# Patient Record
Sex: FEMALE | Race: WHITE | Employment: FULL TIME | URBAN - METROPOLITAN AREA
[De-identification: names, ages, dates, MRNs, and addresses within clinical notes are randomized per-mention and may not be internally consistent; named-entity substitution may affect disease eponyms.]

---

## 2022-12-03 ENCOUNTER — HOSPITAL ENCOUNTER (INPATIENT)
Age: 67
LOS: 4 days | Discharge: HOME HEALTH | DRG: 480 | End: 2022-12-07
Attending: EMERGENCY MEDICINE | Admitting: FAMILY MEDICINE
Payer: MEDICARE

## 2022-12-03 ENCOUNTER — APPOINTMENT (OUTPATIENT)
Dept: CT IMAGING | Age: 67
DRG: 480 | End: 2022-12-03
Payer: MEDICARE

## 2022-12-03 ENCOUNTER — APPOINTMENT (OUTPATIENT)
Dept: GENERAL RADIOLOGY | Age: 67
DRG: 480 | End: 2022-12-03
Payer: MEDICARE

## 2022-12-03 DIAGNOSIS — M54.50 LUMBAR BACK PAIN: ICD-10-CM

## 2022-12-03 DIAGNOSIS — Z96.649 PERI-PROSTHETIC FRACTURE AROUND PROSTHETIC HIP, INITIAL ENCOUNTER: ICD-10-CM

## 2022-12-03 DIAGNOSIS — S01.01XA LACERATION OF SCALP, INITIAL ENCOUNTER: ICD-10-CM

## 2022-12-03 DIAGNOSIS — S09.90XA INJURY OF HEAD, INITIAL ENCOUNTER: Primary | ICD-10-CM

## 2022-12-03 DIAGNOSIS — S72.009A CLOSED FRACTURE OF PROXIMAL END OF FEMUR (HCC): ICD-10-CM

## 2022-12-03 DIAGNOSIS — M97.8XXA PERI-PROSTHETIC FRACTURE AROUND PROSTHETIC HIP, INITIAL ENCOUNTER: ICD-10-CM

## 2022-12-03 LAB
ABO/RH: NORMAL
ALBUMIN SERPL-MCNC: 3.9 G/DL (ref 3.5–5.2)
ALP BLD-CCNC: 102 U/L (ref 35–104)
ALT SERPL-CCNC: 14 U/L (ref 0–32)
ANION GAP SERPL CALCULATED.3IONS-SCNC: 15 MMOL/L (ref 7–16)
ANTIBODY SCREEN: NORMAL
APTT: 27 SEC (ref 24.5–35.1)
AST SERPL-CCNC: 21 U/L (ref 0–31)
BASOPHILS ABSOLUTE: 0.06 E9/L (ref 0–0.2)
BASOPHILS RELATIVE PERCENT: 0.4 % (ref 0–2)
BILIRUB SERPL-MCNC: 0.2 MG/DL (ref 0–1.2)
BUN BLDV-MCNC: 21 MG/DL (ref 6–23)
CALCIUM SERPL-MCNC: 9 MG/DL (ref 8.6–10.2)
CHLORIDE BLD-SCNC: 105 MMOL/L (ref 98–107)
CO2: 20 MMOL/L (ref 22–29)
CREAT SERPL-MCNC: 0.8 MG/DL (ref 0.5–1)
EOSINOPHILS ABSOLUTE: 0.05 E9/L (ref 0.05–0.5)
EOSINOPHILS RELATIVE PERCENT: 0.3 % (ref 0–6)
GFR SERPL CREATININE-BSD FRML MDRD: >60 ML/MIN/1.73
GLUCOSE BLD-MCNC: 171 MG/DL (ref 74–99)
HCT VFR BLD CALC: 38 % (ref 34–48)
HEMOGLOBIN: 12.7 G/DL (ref 11.5–15.5)
IMMATURE GRANULOCYTES #: 0.09 E9/L
IMMATURE GRANULOCYTES %: 0.6 % (ref 0–5)
INR BLD: 1
LYMPHOCYTES ABSOLUTE: 1.05 E9/L (ref 1.5–4)
LYMPHOCYTES RELATIVE PERCENT: 7.3 % (ref 20–42)
MCH RBC QN AUTO: 32.6 PG (ref 26–35)
MCHC RBC AUTO-ENTMCNC: 33.4 % (ref 32–34.5)
MCV RBC AUTO: 97.7 FL (ref 80–99.9)
MONOCYTES ABSOLUTE: 0.73 E9/L (ref 0.1–0.95)
MONOCYTES RELATIVE PERCENT: 5.1 % (ref 2–12)
NEUTROPHILS ABSOLUTE: 12.37 E9/L (ref 1.8–7.3)
NEUTROPHILS RELATIVE PERCENT: 86.3 % (ref 43–80)
PDW BLD-RTO: 14.9 FL (ref 11.5–15)
PLATELET # BLD: 245 E9/L (ref 130–450)
PMV BLD AUTO: 11 FL (ref 7–12)
POTASSIUM SERPL-SCNC: 3.8 MMOL/L (ref 3.5–5)
PROTHROMBIN TIME: 11.2 SEC (ref 9.3–12.4)
RBC # BLD: 3.89 E12/L (ref 3.5–5.5)
SODIUM BLD-SCNC: 140 MMOL/L (ref 132–146)
TOTAL PROTEIN: 7.1 G/DL (ref 6.4–8.3)
TROPONIN, HIGH SENSITIVITY: 9 NG/L (ref 0–9)
WBC # BLD: 14.4 E9/L (ref 4.5–11.5)

## 2022-12-03 PROCEDURE — 85730 THROMBOPLASTIN TIME PARTIAL: CPT

## 2022-12-03 PROCEDURE — 86850 RBC ANTIBODY SCREEN: CPT

## 2022-12-03 PROCEDURE — 85610 PROTHROMBIN TIME: CPT

## 2022-12-03 PROCEDURE — 86900 BLOOD TYPING SEROLOGIC ABO: CPT

## 2022-12-03 PROCEDURE — 96376 TX/PRO/DX INJ SAME DRUG ADON: CPT

## 2022-12-03 PROCEDURE — 73552 X-RAY EXAM OF FEMUR 2/>: CPT

## 2022-12-03 PROCEDURE — 99222 1ST HOSP IP/OBS MODERATE 55: CPT | Performed by: ORTHOPAEDIC SURGERY

## 2022-12-03 PROCEDURE — 70450 CT HEAD/BRAIN W/O DYE: CPT

## 2022-12-03 PROCEDURE — 71045 X-RAY EXAM CHEST 1 VIEW: CPT

## 2022-12-03 PROCEDURE — 72125 CT NECK SPINE W/O DYE: CPT

## 2022-12-03 PROCEDURE — 12001 RPR S/N/AX/GEN/TRNK 2.5CM/<: CPT

## 2022-12-03 PROCEDURE — 86901 BLOOD TYPING SEROLOGIC RH(D): CPT

## 2022-12-03 PROCEDURE — 99285 EMERGENCY DEPT VISIT HI MDM: CPT

## 2022-12-03 PROCEDURE — 73700 CT LOWER EXTREMITY W/O DYE: CPT

## 2022-12-03 PROCEDURE — 96374 THER/PROPH/DIAG INJ IV PUSH: CPT

## 2022-12-03 PROCEDURE — 90714 TD VACC NO PRESV 7 YRS+ IM: CPT

## 2022-12-03 PROCEDURE — 80053 COMPREHEN METABOLIC PANEL: CPT

## 2022-12-03 PROCEDURE — 84484 ASSAY OF TROPONIN QUANT: CPT

## 2022-12-03 PROCEDURE — 72131 CT LUMBAR SPINE W/O DYE: CPT

## 2022-12-03 PROCEDURE — 6360000002 HC RX W HCPCS: Performed by: EMERGENCY MEDICINE

## 2022-12-03 PROCEDURE — 96375 TX/PRO/DX INJ NEW DRUG ADDON: CPT

## 2022-12-03 PROCEDURE — 90471 IMMUNIZATION ADMIN: CPT

## 2022-12-03 PROCEDURE — 6360000002 HC RX W HCPCS

## 2022-12-03 PROCEDURE — 1200000000 HC SEMI PRIVATE

## 2022-12-03 PROCEDURE — 85025 COMPLETE CBC W/AUTO DIFF WBC: CPT

## 2022-12-03 PROCEDURE — 36415 COLL VENOUS BLD VENIPUNCTURE: CPT

## 2022-12-03 PROCEDURE — 73502 X-RAY EXAM HIP UNI 2-3 VIEWS: CPT

## 2022-12-03 PROCEDURE — 93005 ELECTROCARDIOGRAM TRACING: CPT | Performed by: EMERGENCY MEDICINE

## 2022-12-03 RX ORDER — OXYCODONE HYDROCHLORIDE 10 MG/1
10 TABLET ORAL EVERY 4 HOURS PRN
Status: DISCONTINUED | OUTPATIENT
Start: 2022-12-03 | End: 2022-12-07 | Stop reason: HOSPADM

## 2022-12-03 RX ORDER — TETANUS AND DIPHTHERIA TOXOIDS ADSORBED 2; 2 [LF]/.5ML; [LF]/.5ML
INJECTION INTRAMUSCULAR
Status: COMPLETED
Start: 2022-12-03 | End: 2022-12-03

## 2022-12-03 RX ORDER — ONDANSETRON 2 MG/ML
INJECTION INTRAMUSCULAR; INTRAVENOUS
Status: COMPLETED
Start: 2022-12-03 | End: 2022-12-03

## 2022-12-03 RX ORDER — OXYCODONE HYDROCHLORIDE 5 MG/1
5 TABLET ORAL EVERY 4 HOURS PRN
Status: DISCONTINUED | OUTPATIENT
Start: 2022-12-03 | End: 2022-12-07 | Stop reason: HOSPADM

## 2022-12-03 RX ORDER — ACETAMINOPHEN 325 MG/1
650 TABLET ORAL EVERY 4 HOURS PRN
Status: DISCONTINUED | OUTPATIENT
Start: 2022-12-03 | End: 2022-12-04

## 2022-12-03 RX ORDER — ONDANSETRON 2 MG/ML
4 INJECTION INTRAMUSCULAR; INTRAVENOUS ONCE
Status: COMPLETED | OUTPATIENT
Start: 2022-12-03 | End: 2022-12-03

## 2022-12-03 RX ORDER — TETANUS AND DIPHTHERIA TOXOIDS ADSORBED 2; 2 [LF]/.5ML; [LF]/.5ML
0.5 INJECTION INTRAMUSCULAR ONCE
Status: COMPLETED | OUTPATIENT
Start: 2022-12-03 | End: 2022-12-03

## 2022-12-03 RX ORDER — GABAPENTIN 100 MG/1
200 CAPSULE ORAL 3 TIMES DAILY
Status: DISCONTINUED | OUTPATIENT
Start: 2022-12-03 | End: 2022-12-07 | Stop reason: HOSPADM

## 2022-12-03 RX ORDER — BACITRACIN ZINC 500 [USP'U]/G
OINTMENT TOPICAL ONCE
Status: DISCONTINUED | OUTPATIENT
Start: 2022-12-03 | End: 2022-12-04

## 2022-12-03 RX ORDER — MORPHINE SULFATE 4 MG/ML
4 INJECTION, SOLUTION INTRAMUSCULAR; INTRAVENOUS ONCE
Status: COMPLETED | OUTPATIENT
Start: 2022-12-03 | End: 2022-12-03

## 2022-12-03 RX ORDER — METHOCARBAMOL 500 MG/1
1000 TABLET, FILM COATED ORAL 4 TIMES DAILY
Status: DISCONTINUED | OUTPATIENT
Start: 2022-12-03 | End: 2022-12-07 | Stop reason: HOSPADM

## 2022-12-03 RX ORDER — MORPHINE SULFATE 4 MG/ML
INJECTION, SOLUTION INTRAMUSCULAR; INTRAVENOUS
Status: COMPLETED
Start: 2022-12-03 | End: 2022-12-03

## 2022-12-03 RX ADMIN — MORPHINE SULFATE 4 MG: 4 INJECTION, SOLUTION INTRAMUSCULAR; INTRAVENOUS at 21:53

## 2022-12-03 RX ADMIN — MORPHINE SULFATE 4 MG: 4 INJECTION, SOLUTION INTRAMUSCULAR; INTRAVENOUS at 20:16

## 2022-12-03 RX ADMIN — TETANUS AND DIPHTHERIA TOXOIDS ADSORBED 0.5 ML: 2; 2 INJECTION INTRAMUSCULAR at 22:55

## 2022-12-03 RX ADMIN — ONDANSETRON 4 MG: 2 INJECTION INTRAMUSCULAR; INTRAVENOUS at 19:35

## 2022-12-03 ASSESSMENT — PAIN - FUNCTIONAL ASSESSMENT: PAIN_FUNCTIONAL_ASSESSMENT: 0-10

## 2022-12-03 ASSESSMENT — PAIN DESCRIPTION - LOCATION
LOCATION: HIP;LEG
LOCATION: LEG
LOCATION: HIP;LEG

## 2022-12-03 ASSESSMENT — PAIN DESCRIPTION - ORIENTATION
ORIENTATION: RIGHT

## 2022-12-03 ASSESSMENT — PAIN DESCRIPTION - PAIN TYPE
TYPE: ACUTE PAIN
TYPE: ACUTE PAIN

## 2022-12-03 ASSESSMENT — PAIN DESCRIPTION - DESCRIPTORS
DESCRIPTORS: ACHING
DESCRIPTORS: ACHING

## 2022-12-03 ASSESSMENT — PAIN DESCRIPTION - FREQUENCY
FREQUENCY: CONTINUOUS
FREQUENCY: CONTINUOUS

## 2022-12-03 ASSESSMENT — PAIN SCALES - GENERAL
PAINLEVEL_OUTOF10: 9
PAINLEVEL_OUTOF10: 10
PAINLEVEL_OUTOF10: 8

## 2022-12-04 ENCOUNTER — ANESTHESIA EVENT (OUTPATIENT)
Dept: OPERATING ROOM | Age: 67
End: 2022-12-04
Payer: MEDICARE

## 2022-12-04 ENCOUNTER — ANESTHESIA (OUTPATIENT)
Dept: OPERATING ROOM | Age: 67
End: 2022-12-04
Payer: MEDICARE

## 2022-12-04 ENCOUNTER — APPOINTMENT (OUTPATIENT)
Dept: GENERAL RADIOLOGY | Age: 67
DRG: 480 | End: 2022-12-04
Payer: MEDICARE

## 2022-12-04 LAB
ANION GAP SERPL CALCULATED.3IONS-SCNC: 12 MMOL/L (ref 7–16)
BASOPHILS ABSOLUTE: 0.05 E9/L (ref 0–0.2)
BASOPHILS RELATIVE PERCENT: 0.4 % (ref 0–2)
BUN BLDV-MCNC: 17 MG/DL (ref 6–23)
CALCIUM SERPL-MCNC: 8.7 MG/DL (ref 8.6–10.2)
CHLORIDE BLD-SCNC: 106 MMOL/L (ref 98–107)
CO2: 21 MMOL/L (ref 22–29)
CREAT SERPL-MCNC: 0.7 MG/DL (ref 0.5–1)
EOSINOPHILS ABSOLUTE: 0.04 E9/L (ref 0.05–0.5)
EOSINOPHILS RELATIVE PERCENT: 0.4 % (ref 0–6)
GFR SERPL CREATININE-BSD FRML MDRD: >60 ML/MIN/1.73
GLUCOSE BLD-MCNC: 120 MG/DL (ref 74–99)
HCT VFR BLD CALC: 36.1 % (ref 34–48)
HEMOGLOBIN: 11.8 G/DL (ref 11.5–15.5)
IMMATURE GRANULOCYTES #: 0.05 E9/L
IMMATURE GRANULOCYTES %: 0.4 % (ref 0–5)
LYMPHOCYTES ABSOLUTE: 1.42 E9/L (ref 1.5–4)
LYMPHOCYTES RELATIVE PERCENT: 12.7 % (ref 20–42)
MCH RBC QN AUTO: 32.8 PG (ref 26–35)
MCHC RBC AUTO-ENTMCNC: 32.7 % (ref 32–34.5)
MCV RBC AUTO: 100.3 FL (ref 80–99.9)
MONOCYTES ABSOLUTE: 0.81 E9/L (ref 0.1–0.95)
MONOCYTES RELATIVE PERCENT: 7.2 % (ref 2–12)
NEUTROPHILS ABSOLUTE: 8.83 E9/L (ref 1.8–7.3)
NEUTROPHILS RELATIVE PERCENT: 78.9 % (ref 43–80)
PDW BLD-RTO: 15.1 FL (ref 11.5–15)
PLATELET # BLD: 246 E9/L (ref 130–450)
PMV BLD AUTO: 10.5 FL (ref 7–12)
POTASSIUM REFLEX MAGNESIUM: 4.1 MMOL/L (ref 3.5–5)
RBC # BLD: 3.6 E12/L (ref 3.5–5.5)
SODIUM BLD-SCNC: 139 MMOL/L (ref 132–146)
WBC # BLD: 11.2 E9/L (ref 4.5–11.5)

## 2022-12-04 PROCEDURE — 27244 TREAT THIGH FRACTURE: CPT | Performed by: ORTHOPAEDIC SURGERY

## 2022-12-04 PROCEDURE — 6370000000 HC RX 637 (ALT 250 FOR IP)

## 2022-12-04 PROCEDURE — 2720000010 HC SURG SUPPLY STERILE: Performed by: ORTHOPAEDIC SURGERY

## 2022-12-04 PROCEDURE — 2700000000 HC OXYGEN THERAPY PER DAY

## 2022-12-04 PROCEDURE — 3700000001 HC ADD 15 MINUTES (ANESTHESIA): Performed by: ORTHOPAEDIC SURGERY

## 2022-12-04 PROCEDURE — 6360000002 HC RX W HCPCS: Performed by: PHYSICAL THERAPY ASSISTANT

## 2022-12-04 PROCEDURE — 2580000003 HC RX 258: Performed by: PHYSICAL THERAPY ASSISTANT

## 2022-12-04 PROCEDURE — 6370000000 HC RX 637 (ALT 250 FOR IP): Performed by: FAMILY MEDICINE

## 2022-12-04 PROCEDURE — 6370000000 HC RX 637 (ALT 250 FOR IP): Performed by: PHYSICAL THERAPY ASSISTANT

## 2022-12-04 PROCEDURE — C1776 JOINT DEVICE (IMPLANTABLE): HCPCS | Performed by: ORTHOPAEDIC SURGERY

## 2022-12-04 PROCEDURE — 80048 BASIC METABOLIC PNL TOTAL CA: CPT

## 2022-12-04 PROCEDURE — 1200000000 HC SEMI PRIVATE

## 2022-12-04 PROCEDURE — 85025 COMPLETE CBC W/AUTO DIFF WBC: CPT

## 2022-12-04 PROCEDURE — 2580000003 HC RX 258: Performed by: FAMILY MEDICINE

## 2022-12-04 PROCEDURE — 36415 COLL VENOUS BLD VENIPUNCTURE: CPT

## 2022-12-04 PROCEDURE — 6360000002 HC RX W HCPCS: Performed by: FAMILY MEDICINE

## 2022-12-04 PROCEDURE — 7100000000 HC PACU RECOVERY - FIRST 15 MIN: Performed by: ORTHOPAEDIC SURGERY

## 2022-12-04 PROCEDURE — 0QS604Z REPOSITION RIGHT UPPER FEMUR WITH INTERNAL FIXATION DEVICE, OPEN APPROACH: ICD-10-PCS | Performed by: ORTHOPAEDIC SURGERY

## 2022-12-04 PROCEDURE — 73552 X-RAY EXAM OF FEMUR 2/>: CPT

## 2022-12-04 PROCEDURE — C1769 GUIDE WIRE: HCPCS | Performed by: ORTHOPAEDIC SURGERY

## 2022-12-04 PROCEDURE — 3600000015 HC SURGERY LEVEL 5 ADDTL 15MIN: Performed by: ORTHOPAEDIC SURGERY

## 2022-12-04 PROCEDURE — 73502 X-RAY EXAM HIP UNI 2-3 VIEWS: CPT

## 2022-12-04 PROCEDURE — 2500000003 HC RX 250 WO HCPCS: Performed by: NURSE ANESTHETIST, CERTIFIED REGISTERED

## 2022-12-04 PROCEDURE — 3700000000 HC ANESTHESIA ATTENDED CARE: Performed by: ORTHOPAEDIC SURGERY

## 2022-12-04 PROCEDURE — 6360000002 HC RX W HCPCS: Performed by: NURSE ANESTHETIST, CERTIFIED REGISTERED

## 2022-12-04 PROCEDURE — C1713 ANCHOR/SCREW BN/BN,TIS/BN: HCPCS | Performed by: ORTHOPAEDIC SURGERY

## 2022-12-04 PROCEDURE — 3600000005 HC SURGERY LEVEL 5 BASE: Performed by: ORTHOPAEDIC SURGERY

## 2022-12-04 PROCEDURE — 2709999900 HC NON-CHARGEABLE SUPPLY: Performed by: ORTHOPAEDIC SURGERY

## 2022-12-04 PROCEDURE — 6360000002 HC RX W HCPCS: Performed by: ANESTHESIOLOGY

## 2022-12-04 PROCEDURE — 3209999900 FLUORO FOR SURGICAL PROCEDURES

## 2022-12-04 PROCEDURE — 2580000003 HC RX 258: Performed by: NURSE ANESTHETIST, CERTIFIED REGISTERED

## 2022-12-04 PROCEDURE — 7100000001 HC PACU RECOVERY - ADDTL 15 MIN: Performed by: ORTHOPAEDIC SURGERY

## 2022-12-04 PROCEDURE — 6360000002 HC RX W HCPCS: Performed by: ORTHOPAEDIC SURGERY

## 2022-12-04 DEVICE — SCREW BNE L34MM DIA4.5MM PROX CORT TIB S STL ST LOK FULL: Type: IMPLANTABLE DEVICE | Site: FEMUR | Status: FUNCTIONAL

## 2022-12-04 DEVICE — SCREW BNE L38MM DIA4.5MM PROX CORT TIB S STL ST LOK FULL: Type: IMPLANTABLE DEVICE | Site: FEMUR | Status: FUNCTIONAL

## 2022-12-04 DEVICE — SCREW BNE L10MM DIA5MM UNICORTICAL PERIPROSTHETIC S STL ST: Type: IMPLANTABLE DEVICE | Site: FEMUR | Status: FUNCTIONAL

## 2022-12-04 DEVICE — IMPLANTABLE DEVICE: Type: IMPLANTABLE DEVICE | Site: FEMUR | Status: FUNCTIONAL

## 2022-12-04 DEVICE — CABLE SURG L750MM DIA1MM S STL SMOOTH W/ CRMP: Type: IMPLANTABLE DEVICE | Site: FEMUR | Status: FUNCTIONAL

## 2022-12-04 DEVICE — SCREW BNE L75MM DIA3.5MM PROX TIB S STL ST FULL THRD T15: Type: IMPLANTABLE DEVICE | Site: FEMUR | Status: FUNCTIONAL

## 2022-12-04 DEVICE — SCREW BNE L30MM DIA3.5MM CORT S STL ST FULL THRD LOK T15: Type: IMPLANTABLE DEVICE | Site: FEMUR | Status: FUNCTIONAL

## 2022-12-04 DEVICE — SCREW BNE L32MM DIA4.5MM PROX CORT TIB S STL ST LOK FULL: Type: IMPLANTABLE DEVICE | Site: FEMUR | Status: FUNCTIONAL

## 2022-12-04 DEVICE — SCREW BNE L28MM DIA3.5MM CORT S STL ST FULL THRD LOK T15: Type: IMPLANTABLE DEVICE | Site: FEMUR | Status: FUNCTIONAL

## 2022-12-04 DEVICE — SCREW BNE L26MM DIA3.5MM PROX TIB S STL ST FULL THRD T15: Type: IMPLANTABLE DEVICE | Site: FEMUR | Status: FUNCTIONAL

## 2022-12-04 RX ORDER — FENTANYL CITRATE 50 UG/ML
INJECTION, SOLUTION INTRAMUSCULAR; INTRAVENOUS PRN
Status: DISCONTINUED | OUTPATIENT
Start: 2022-12-04 | End: 2022-12-04 | Stop reason: SDUPTHER

## 2022-12-04 RX ORDER — SODIUM CHLORIDE 0.9 % (FLUSH) 0.9 %
5-40 SYRINGE (ML) INJECTION EVERY 12 HOURS SCHEDULED
Status: DISCONTINUED | OUTPATIENT
Start: 2022-12-04 | End: 2022-12-05 | Stop reason: ALTCHOICE

## 2022-12-04 RX ORDER — NEOSTIGMINE METHYLSULFATE 1 MG/ML
INJECTION, SOLUTION INTRAVENOUS PRN
Status: DISCONTINUED | OUTPATIENT
Start: 2022-12-04 | End: 2022-12-04 | Stop reason: SDUPTHER

## 2022-12-04 RX ORDER — ACETAMINOPHEN 325 MG/1
650 TABLET ORAL EVERY 6 HOURS PRN
Status: DISCONTINUED | OUTPATIENT
Start: 2022-12-04 | End: 2022-12-07 | Stop reason: HOSPADM

## 2022-12-04 RX ORDER — ONDANSETRON 2 MG/ML
4 INJECTION INTRAMUSCULAR; INTRAVENOUS EVERY 6 HOURS PRN
Status: DISCONTINUED | OUTPATIENT
Start: 2022-12-04 | End: 2022-12-07 | Stop reason: HOSPADM

## 2022-12-04 RX ORDER — METOPROLOL TARTRATE 5 MG/5ML
INJECTION INTRAVENOUS PRN
Status: DISCONTINUED | OUTPATIENT
Start: 2022-12-04 | End: 2022-12-04 | Stop reason: SDUPTHER

## 2022-12-04 RX ORDER — PROMETHAZINE HYDROCHLORIDE 12.5 MG/1
12.5 TABLET ORAL EVERY 6 HOURS PRN
Status: DISCONTINUED | OUTPATIENT
Start: 2022-12-04 | End: 2022-12-07 | Stop reason: HOSPADM

## 2022-12-04 RX ORDER — KETOROLAC TROMETHAMINE 30 MG/ML
INJECTION, SOLUTION INTRAMUSCULAR; INTRAVENOUS PRN
Status: DISCONTINUED | OUTPATIENT
Start: 2022-12-04 | End: 2022-12-04 | Stop reason: SDUPTHER

## 2022-12-04 RX ORDER — MIDAZOLAM HYDROCHLORIDE 1 MG/ML
INJECTION INTRAMUSCULAR; INTRAVENOUS PRN
Status: DISCONTINUED | OUTPATIENT
Start: 2022-12-04 | End: 2022-12-04 | Stop reason: SDUPTHER

## 2022-12-04 RX ORDER — SODIUM CHLORIDE 0.9 % (FLUSH) 0.9 %
5-40 SYRINGE (ML) INJECTION PRN
Status: DISCONTINUED | OUTPATIENT
Start: 2022-12-04 | End: 2022-12-05 | Stop reason: ALTCHOICE

## 2022-12-04 RX ORDER — ROCURONIUM BROMIDE 10 MG/ML
INJECTION, SOLUTION INTRAVENOUS PRN
Status: DISCONTINUED | OUTPATIENT
Start: 2022-12-04 | End: 2022-12-04 | Stop reason: SDUPTHER

## 2022-12-04 RX ORDER — OXYCODONE HYDROCHLORIDE AND ACETAMINOPHEN 5; 325 MG/1; MG/1
1 TABLET ORAL EVERY 6 HOURS PRN
Qty: 28 TABLET | Refills: 0 | Status: SHIPPED | OUTPATIENT
Start: 2022-12-04 | End: 2022-12-11

## 2022-12-04 RX ORDER — SODIUM CHLORIDE 9 MG/ML
INJECTION, SOLUTION INTRAVENOUS PRN
Status: DISCONTINUED | OUTPATIENT
Start: 2022-12-04 | End: 2022-12-05 | Stop reason: ALTCHOICE

## 2022-12-04 RX ORDER — SODIUM CHLORIDE 9 MG/ML
INJECTION, SOLUTION INTRAVENOUS PRN
Status: DISCONTINUED | OUTPATIENT
Start: 2022-12-04 | End: 2022-12-07 | Stop reason: HOSPADM

## 2022-12-04 RX ORDER — GLYCOPYRROLATE 0.2 MG/ML
INJECTION INTRAMUSCULAR; INTRAVENOUS PRN
Status: DISCONTINUED | OUTPATIENT
Start: 2022-12-04 | End: 2022-12-04 | Stop reason: SDUPTHER

## 2022-12-04 RX ORDER — VANCOMYCIN HYDROCHLORIDE 500 MG/10ML
INJECTION, POWDER, LYOPHILIZED, FOR SOLUTION INTRAVENOUS PRN
Status: DISCONTINUED | OUTPATIENT
Start: 2022-12-04 | End: 2022-12-04 | Stop reason: ALTCHOICE

## 2022-12-04 RX ORDER — CEFAZOLIN SODIUM 1 G/3ML
INJECTION, POWDER, FOR SOLUTION INTRAMUSCULAR; INTRAVENOUS PRN
Status: DISCONTINUED | OUTPATIENT
Start: 2022-12-04 | End: 2022-12-04 | Stop reason: SDUPTHER

## 2022-12-04 RX ORDER — SODIUM CHLORIDE 9 MG/ML
INJECTION, SOLUTION INTRAVENOUS CONTINUOUS
Status: DISCONTINUED | OUTPATIENT
Start: 2022-12-04 | End: 2022-12-07 | Stop reason: HOSPADM

## 2022-12-04 RX ORDER — ONDANSETRON 2 MG/ML
4 INJECTION INTRAMUSCULAR; INTRAVENOUS
Status: ACTIVE | OUTPATIENT
Start: 2022-12-04 | End: 2022-12-05

## 2022-12-04 RX ORDER — PROPOFOL 10 MG/ML
INJECTION, EMULSION INTRAVENOUS PRN
Status: DISCONTINUED | OUTPATIENT
Start: 2022-12-04 | End: 2022-12-04 | Stop reason: SDUPTHER

## 2022-12-04 RX ORDER — DEXAMETHASONE SODIUM PHOSPHATE 10 MG/ML
INJECTION INTRAMUSCULAR; INTRAVENOUS PRN
Status: DISCONTINUED | OUTPATIENT
Start: 2022-12-04 | End: 2022-12-04 | Stop reason: SDUPTHER

## 2022-12-04 RX ORDER — SODIUM CHLORIDE 9 MG/ML
INJECTION, SOLUTION INTRAVENOUS CONTINUOUS PRN
Status: DISCONTINUED | OUTPATIENT
Start: 2022-12-04 | End: 2022-12-04 | Stop reason: SDUPTHER

## 2022-12-04 RX ORDER — POLYETHYLENE GLYCOL 3350 17 G/17G
17 POWDER, FOR SOLUTION ORAL DAILY PRN
Status: DISCONTINUED | OUTPATIENT
Start: 2022-12-04 | End: 2022-12-05

## 2022-12-04 RX ORDER — SODIUM CHLORIDE 0.9 % (FLUSH) 0.9 %
10 SYRINGE (ML) INJECTION PRN
Status: DISCONTINUED | OUTPATIENT
Start: 2022-12-04 | End: 2022-12-07 | Stop reason: HOSPADM

## 2022-12-04 RX ORDER — ONDANSETRON 2 MG/ML
INJECTION INTRAMUSCULAR; INTRAVENOUS PRN
Status: DISCONTINUED | OUTPATIENT
Start: 2022-12-04 | End: 2022-12-04 | Stop reason: SDUPTHER

## 2022-12-04 RX ORDER — SODIUM CHLORIDE 0.9 % (FLUSH) 0.9 %
10 SYRINGE (ML) INJECTION EVERY 12 HOURS SCHEDULED
Status: DISCONTINUED | OUTPATIENT
Start: 2022-12-04 | End: 2022-12-07 | Stop reason: HOSPADM

## 2022-12-04 RX ORDER — ACETAMINOPHEN 650 MG/1
650 SUPPOSITORY RECTAL EVERY 6 HOURS PRN
Status: DISCONTINUED | OUTPATIENT
Start: 2022-12-04 | End: 2022-12-07 | Stop reason: HOSPADM

## 2022-12-04 RX ORDER — LIDOCAINE HYDROCHLORIDE 20 MG/ML
INJECTION, SOLUTION INTRAVENOUS PRN
Status: DISCONTINUED | OUTPATIENT
Start: 2022-12-04 | End: 2022-12-04 | Stop reason: SDUPTHER

## 2022-12-04 RX ORDER — MORPHINE SULFATE 4 MG/ML
4 INJECTION, SOLUTION INTRAMUSCULAR; INTRAVENOUS EVERY 4 HOURS PRN
Status: DISCONTINUED | OUTPATIENT
Start: 2022-12-04 | End: 2022-12-07 | Stop reason: HOSPADM

## 2022-12-04 RX ADMIN — DEXAMETHASONE SODIUM PHOSPHATE 10 MG: 10 INJECTION INTRAMUSCULAR; INTRAVENOUS at 10:40

## 2022-12-04 RX ADMIN — SODIUM CHLORIDE: 9 INJECTION, SOLUTION INTRAVENOUS at 15:30

## 2022-12-04 RX ADMIN — SODIUM CHLORIDE, PRESERVATIVE FREE 10 ML: 5 INJECTION INTRAVENOUS at 04:10

## 2022-12-04 RX ADMIN — GABAPENTIN 200 MG: 100 CAPSULE ORAL at 00:38

## 2022-12-04 RX ADMIN — METHOCARBAMOL 1000 MG: 500 TABLET ORAL at 20:18

## 2022-12-04 RX ADMIN — GLYCOPYRROLATE 0.6 MG: 0.2 INJECTION, SOLUTION INTRAMUSCULAR; INTRAVENOUS at 12:00

## 2022-12-04 RX ADMIN — GABAPENTIN 200 MG: 100 CAPSULE ORAL at 20:18

## 2022-12-04 RX ADMIN — PROPOFOL 90 MG: 10 INJECTION, EMULSION INTRAVENOUS at 10:40

## 2022-12-04 RX ADMIN — OXYCODONE HYDROCHLORIDE 10 MG: 10 TABLET ORAL at 20:18

## 2022-12-04 RX ADMIN — CEFAZOLIN 2 G: 1 INJECTION, POWDER, FOR SOLUTION INTRAMUSCULAR; INTRAVENOUS at 10:45

## 2022-12-04 RX ADMIN — CEFAZOLIN 2000 MG: 2 INJECTION, POWDER, FOR SOLUTION INTRAMUSCULAR; INTRAVENOUS at 20:19

## 2022-12-04 RX ADMIN — OXYCODONE HYDROCHLORIDE 10 MG: 10 TABLET ORAL at 07:01

## 2022-12-04 RX ADMIN — SODIUM CHLORIDE: 9 INJECTION, SOLUTION INTRAVENOUS at 06:55

## 2022-12-04 RX ADMIN — FENTANYL CITRATE 100 MCG: 50 INJECTION, SOLUTION INTRAMUSCULAR; INTRAVENOUS at 10:57

## 2022-12-04 RX ADMIN — Medication 3 MG: at 12:00

## 2022-12-04 RX ADMIN — FENTANYL CITRATE 100 MCG: 50 INJECTION, SOLUTION INTRAMUSCULAR; INTRAVENOUS at 10:40

## 2022-12-04 RX ADMIN — ROCURONIUM BROMIDE 50 MG: 10 INJECTION, SOLUTION INTRAVENOUS at 10:40

## 2022-12-04 RX ADMIN — HYDROMORPHONE HYDROCHLORIDE 0.25 MG: 1 INJECTION, SOLUTION INTRAMUSCULAR; INTRAVENOUS; SUBCUTANEOUS at 13:52

## 2022-12-04 RX ADMIN — METOPROLOL TARTRATE 2.5 MG: 5 INJECTION, SOLUTION INTRAVENOUS at 11:06

## 2022-12-04 RX ADMIN — LIDOCAINE HYDROCHLORIDE 60 MG: 20 INJECTION, SOLUTION INTRAVENOUS at 10:40

## 2022-12-04 RX ADMIN — SODIUM CHLORIDE, PRESERVATIVE FREE 10 ML: 5 INJECTION INTRAVENOUS at 20:19

## 2022-12-04 RX ADMIN — MIDAZOLAM 2 MG: 1 INJECTION INTRAMUSCULAR; INTRAVENOUS at 10:30

## 2022-12-04 RX ADMIN — OXYCODONE HYDROCHLORIDE 10 MG: 10 TABLET ORAL at 00:39

## 2022-12-04 RX ADMIN — METOPROLOL TARTRATE 2.5 MG: 5 INJECTION, SOLUTION INTRAVENOUS at 10:52

## 2022-12-04 RX ADMIN — FENTANYL CITRATE 50 MCG: 50 INJECTION, SOLUTION INTRAMUSCULAR; INTRAVENOUS at 11:07

## 2022-12-04 RX ADMIN — ROCURONIUM BROMIDE 20 MG: 10 INJECTION, SOLUTION INTRAVENOUS at 11:11

## 2022-12-04 RX ADMIN — KETOROLAC TROMETHAMINE 30 MG: 30 INJECTION, SOLUTION INTRAMUSCULAR at 12:00

## 2022-12-04 RX ADMIN — SODIUM CHLORIDE: 9 INJECTION, SOLUTION INTRAVENOUS at 10:30

## 2022-12-04 RX ADMIN — ONDANSETRON 4 MG: 2 INJECTION INTRAMUSCULAR; INTRAVENOUS at 11:57

## 2022-12-04 RX ADMIN — METHOCARBAMOL 1000 MG: 500 TABLET ORAL at 00:38

## 2022-12-04 RX ADMIN — MORPHINE SULFATE 4 MG: 4 INJECTION, SOLUTION INTRAMUSCULAR; INTRAVENOUS at 04:09

## 2022-12-04 ASSESSMENT — PAIN DESCRIPTION - LOCATION
LOCATION: HIP
LOCATION: HIP
LOCATION: HIP;LEG
LOCATION: HIP

## 2022-12-04 ASSESSMENT — PAIN DESCRIPTION - DESCRIPTORS
DESCRIPTORS: ACHING;BURNING;SHARP;SHOOTING
DESCRIPTORS: ACHING;DISCOMFORT;SHARP
DESCRIPTORS: ACHING;DISCOMFORT;SORE

## 2022-12-04 ASSESSMENT — PAIN DESCRIPTION - ORIENTATION
ORIENTATION: RIGHT

## 2022-12-04 ASSESSMENT — PAIN - FUNCTIONAL ASSESSMENT
PAIN_FUNCTIONAL_ASSESSMENT: PREVENTS OR INTERFERES SOME ACTIVE ACTIVITIES AND ADLS
PAIN_FUNCTIONAL_ASSESSMENT: PREVENTS OR INTERFERES SOME ACTIVE ACTIVITIES AND ADLS

## 2022-12-04 ASSESSMENT — PAIN SCALES - GENERAL
PAINLEVEL_OUTOF10: 9
PAINLEVEL_OUTOF10: 5
PAINLEVEL_OUTOF10: 5
PAINLEVEL_OUTOF10: 10
PAINLEVEL_OUTOF10: 0
PAINLEVEL_OUTOF10: 0
PAINLEVEL_OUTOF10: 6
PAINLEVEL_OUTOF10: 8

## 2022-12-04 ASSESSMENT — LIFESTYLE VARIABLES: SMOKING_STATUS: 1

## 2022-12-04 NOTE — ANESTHESIA POSTPROCEDURE EVALUATION
Department of Anesthesiology  Postprocedure Note    Patient: Page Anderson  MRN: [id number]  [place]: 1955  Date of evaluation: 12/4/2022      Procedure Summary     Date: 12/04/22 [place] [facility] [place] [facility]    Anesthesia Start: 1030 Anesthesia Stop: 1247    Procedure: ORIF RIGHT PERIPROSTHETIC HIP FRACTURE (Right: Hip) Diagnosis:       Periprosthetic fracture of hip, initial encounter      (RIGHT PERIPROSTHETIC HIP FRACTURE)    Surgeons: Wei Gagnon DO Responsible Provider: Víctor Flynn MD    Anesthesia Type: General ASA Status: 3          Anesthesia Type: General    Juan Diego Phase I: Juan Diego Score: 5    Juan Diego Phase II:        Anesthesia Post Evaluation    Patient location during evaluation: PACU  Patient participation: complete - patient participated  Level of consciousness: awake  Pain score: 3  Airway patency: patent  Nausea & Vomiting: no nausea  Complications: no  Cardiovascular status: hemodynamically stable  Respiratory status: acceptable  Hydration status: stable  Multimodal analgesia pain management approach

## 2022-12-04 NOTE — DISCHARGE INSTRUCTIONS
[facility] Department of Orthopedic Surgery  [address]    Dr. Adair Fisher DO         MD Dr. Ariel Maravilla MD Jill Roger, PA-C Sonna Nian PA-C Lester Herder PA-C      Orthopaedics Discharge Instructions   Weight bearing Status - Toe touch weight bearing - on right lower Extremity  Pain medication Per Prescriptions  Contact Office for Medication Refill- [phone]  Office can refill pain med every 7 days  If patient discharging to facility then pain control will be continued per facility physician  Ice to operative/injured site for 15-30 minutes of each hour for next 5 days    Recommend that you continue to ice the area 2-3 times per day after this   Elevate operative/injured limb on 2 pillows at home  Goal is to have limb above the heart if able  Continue DVT Prophylaxis (blood clot prevention) as Prescribed   Wound care - Can take off the dressing at the surgical site seven days after the date of surgery. Can just peel off. After, do daily dressing changes as needed until the drainage from the surgical site ceases. Follow Up in Office in 2 weeks. Your first post op appointment is often with one of our PAs. Call the office at [phone] or directions or with any questions. Watch for these significant complications. Call physician if they or any other problems occur:  Fever over 101°, redness, swelling or warmth at the operative site  Unrelieved nausea    Foul smelling or cloudy drainage at the operative site   Unrelieved pain    Blood soaked dressing. (Some oozing may be normal)     Numb, pale, blue, cold or tingling extremity    No future appointments. It is the Department of Orthopaedic Trauma's standard of practice that providers will de-escalate(wean) all patients from narcotic(opioid) medications during the post-operative period.    We provide multimodal pain control but opioid medications are tapered in all of our patients. If patient requires referral to pain management for prolonged taper off of opioid pain medication we will facilitate this process.       Bring [facility] papers to appointment or fax them to [phone]

## 2022-12-04 NOTE — CONSULTS
Department of Orthopedic Surgery  Resident Consult Note          Reason for Consult: Right Hip Pain    HISTORY OF PRESENT ILLNESS:       Patient is a 79 y.o. female who presents with hip pain after a fall. Patient reports fall that occurred approximately 2 hours ago down steps. Reports head trauma without loss of consciousness. Pain localized to right thigh and nonradiating. Anticoagulation - none. The patient is currently employed as a Banker. The patient is community Ambulator without assistant. . Pt lives at home. Patient has a significant history of HTN. Bilateral total hip arthroplasty; most recent was the right hip performed by Dr. Jennifer Rene in [place] 7/11/2017. Denies numbness/tingling/paresthesias. Reports some nausea. Denies acute fever, chills, nchest pain and shortness of breath. Denies any other orthopedic complaints at this time. Tobacco use: 1 PPD   Alcohol use: none  Illicit drug use: no history of illicit drug use    Past Medical History:    History reviewed. No pertinent past medical history. Past Surgical History:    History reviewed. No pertinent surgical history. Current Medications:   Current Facility-Administered Medications: methocarbamol (ROBAXIN) tablet 1,000 mg, 1,000 mg, Oral, 4x Daily  gabapentin (NEURONTIN) capsule 200 mg, 200 mg, Oral, TID  oxyCODONE (ROXICODONE) immediate release tablet 5 mg, 5 mg, Oral, Q4H PRN **OR** oxyCODONE HCl (OXY-IR) immediate release tablet 10 mg, 10 mg, Oral, Q4H PRN  acetaminophen (TYLENOL) tablet 650 mg, 650 mg, Oral, Q4H PRN  Allergies:  Patient has no allergy information on record. Social History:   TOBACCO:   has no history on file for tobacco use. ETOH:   has no history on file for alcohol use. DRUGS:   has no history on file for drug use. ACTIVITIES OF DAILY LIVING:    OCCUPATION:    Family History:   History reviewed. No pertinent family history.     REVIEW OF SYSTEMS:  CONSTITUTIONAL:  negative for  fevers, chills  EYES:  negative for blurred vision, visual disturbance  HEENT:  negative for  hearing loss, voice change  RESPIRATORY:  negative for  dyspnea, wheezing  CARDIOVASCULAR:  negative for  chest pain, palpitations  GASTROINTESTINAL:  negative for nausea, vomiting  GENITOURINARY:  negative for frequency, urinary incontinence  HEMATOLOGIC/LYMPHATIC:  negative for bleeding and petechiae  MUSCULOSKELETAL:  positive for  pain, decreased range of motion, and bone pain  NEUROLOGICAL:  negative for headaches, dizziness  BEHAVIOR/PSYCH:  negative for increased agitation and anxiety    PHYSICAL EXAM:    VITALS:  BP (!) 167/76   Pulse (!) 105   Temp 98 °F (36.7 °C)   Resp 18   Wt 190 lb (86.2 kg)   SpO2 93%   CONSTITUTIONAL:  awake, alert, cooperative, no apparent distress, and appears stated age  General appearance: alert, well appearing, and in no distress,  normal appearing weight  Mental status: alert, oriented to person, place, and time, normal mood, behavior, speech, dress, motor activity, and thought processes  Abdomen: soft, nondistended   Resp:   resp easy and unlabored, no audible wheezes note  Cardiac: distal pulses palpable, skin well perfused  Neurological: alert, oriented X3, normal speech, no focal findings or movement disorder noted, motor and sensory grossly normal bilaterally, normal muscle tone, no tremors, strength 5/5, normal gait and station  HEENT: normochephalic atraumatic, external ears and eyes normal, sclera normal, neck supple  Extremities:   peripheral pulses normal, no edema, redness or tenderness in the calves   Skin: normal coloration, no rashes or open wounds, no suspicious skin lesions noted  Psych: Affect euthymic   MUSCULOSKELETAL:  Right lower Extremity:  Shortened and externally rotated  +Log roll  + Heel Strike  -TTP over Knee and Ankle  Skin intact with no signs of degloving  Compartments soft and compressible, calf non-tender  +DP & PT pulses, Brisk Cap refill, Toes warm and perfused  Sensation grossly intact superficial/deep peroneal,saphenous,sural,tibial n. distributions  +GS/TA/EHL. Secondary Exam:   bilateralUE: No obvious signs of trauma. -TTP to fingers, hand, wrist, forearm, elbow, humerus, shoulder or clavicle. -- Patient able to flex/extend fingers, wrist, elbow and shoulder with active and passive ROM without pain, +2/4 Radial pulse, cap refill <3sec, +AIN/PIN/Radial/Ulnar/Median N, distal sensation grossly intact to C4-T1 dermatomes, compartments soft and compressible. leftLE: No obvious signs of trauma. -TTP to foot, ankle, leg, knee, thigh, hip.-- Patient able to flex/extend toes, ankle, knee and hip with active and passive ROM without pain,+2/4 DP & PT pulses, cap refill <3sec, +5/5 PF/DF/EHL, distal sensation grossly intact to L4-S1 dermatomes, compartments soft and compressible. Pelvis: -TTP, -Log roll, -Heel strike     DATA:    CBC:   Lab Results   Component Value Date/Time    WBC 14.4 12/03/2022 08:42 PM    RBC 3.89 12/03/2022 08:42 PM    HGB 12.7 12/03/2022 08:42 PM    HCT 38.0 12/03/2022 08:42 PM    MCV 97.7 12/03/2022 08:42 PM    MCH 32.6 12/03/2022 08:42 PM    MCHC 33.4 12/03/2022 08:42 PM    RDW 14.9 12/03/2022 08:42 PM     12/03/2022 08:42 PM    MPV 11.0 12/03/2022 08:42 PM     PT/INR:  No results found for: PROTIME, INR  Lactic Acid : No results found for: [address]    Radiology Review:  12/03/22 - XR pelvis right hip demonstrates bilateral total hip arthroplasty. Right hip demonstrates oblique fracture at the femoral component. Shortened, varus angulation   with what appears to be subsidence of the trunnion into the proximal femur. XR right knee demonstrates no hardware. NO acute fractures or dislocations    CT right hip shows fracture line originating anteromedial and involving majority of the anterior cortex. Femoral component appears to fixed within the proximal segement. translation of the distal segment anteriorly. Greater trochanter appears to relatively intact. IMPRESSION:   Closed, Right Periprosthetic hip Fracture    PLAN:  Plan for surgery with Dr. Liam Smith on 12/4  Risk, benefits and treatment options were discussed and has verbalized understanding of options. The possibility of complications were also discussed to include but not limited to nerve damage, infection, problems with wound healing, vascular injury, chronic pain, stiffness, dysfunction, nonhealing of the bone, symptomatic hardware and/or its failure, need for subsequent surgery, dislocation, and blood clots as well as medical related problems and other problems not specifically discussed. Risk of anesthesia also discussed to include death. After all questions and concerns were address, she agreed to proceed with the procedure. NPO effective at midnight, Needs medical assessment for surgery  RLE Non-weight bearing  Pre-op Labs and Imaging Completed  Pain control IV/PO  Hold Anticoagulation   Treatment consent  All questions and concerns from patient and family addressed, and patient is agreeable to plan. Review and discuss with Dr. Ángel Sol Attending    I have seen and evaluated the patient and agree with the above assessment. I have performed the key components of the history and physical examination and concur completely with the findings as documented. CC: Right hip pain    HPI: This is a 79 y.o. female who presents with hip pain after a fall. Patient reports fall that occurred approximately 2 hours ago down steps. Reports head trauma without loss of consciousness. Pain localized to right thigh and nonradiating. Anticoagulation - none. The patient is currently employed as a Banker. The patient is community Ambulator without assistant. . Pt lives at home. Patient has a significant history of HTN. Bilateral total hip arthroplasty; most recent was the right hip performed by Dr. Ezzie Sacks in [facility] 7/11/2017. Denies numbness/tingling/paresthesias. Reports some nausea. Denies acute fever, chills, nchest pain and shortness of breath. Denies any other orthopedic complaints at this time. ROS, medications, allergies, past medical/surgical/social/family histories reviewed and as above    PE:  BP (!) 160/68   Pulse 99   Temp 97.6 °F (36.4 °C) (Temporal)   Resp 17   Ht 5' 7\" (1.702 m)   Wt 191 lb (86.6 kg)   SpO2 93%   BMI 29.91 kg/m²   As above    Radiographic Review XR/CT:  Previous bilateral total hip arthroplasty, [place] components, there is right periprosthetic fracture about previous press-fit ML taper stem. Prosthetic hip is located, acetabular component appears stable. Does appear to be fixation of the stem to the proximal fragment, there is a spiral fracture about the distal third of the stem which is displaced. ASSESSMENT:  Right proximal femur periprosthetic fracture about previous THERESA    PLAN:  Had lengthy discussion with patient regarding their diagnosis, typical prognosis, and expected outcomes. We reviewed the possible complications from the injury itself despite treatment chosen. We also discussed treatment options including nonoperative managements versus surgical management, along with risks and benefits of each. Patient has elected for surgical management despite associated risks. We also discussed ORIF versus revision THERESA if indicated and risk and benefits of each. Medical admit with surgical optimization  Planning for OR 12/4/2022 for right proximal femur periprosthetic fracture ORIF, possible revision total hip arthroplasty  Maintain bedrest  I have explained the risks and complications of the recommended surgery with the patient at length, as well as discussed potential treatment alternatives including nonoperative management.  These risks include but are not limited to death or complication from anesthesia, continued pain, nerve tendon or vascular injury, infection, nonunion or malunion, symptomatic hardware or hardware failure, deep vein thrombosis or pulmonary embolism, additional fracture, dislocation, leg length discrepancy, heterotopic bone formation, unforeseen complications, and need for further surgery, etc.  Patient understood this, asked appropriate questions, which were all answered, and she has elected to proceed with the procedure. Electronically signed by   Yisel Meyers DO  12/4/2022     NOTE: This report was transcribed using voice recognition software.  Every effort was made to ensure accuracy; however, inadvertent computerized transcription errors may be present

## 2022-12-04 NOTE — PROGRESS NOTES
Hospitalist Progress Note      Synopsis: Patient admitted on 12/3/2022 Ms. Clarissa Cardenas, a 79y.o. year old female  who  has no past medical history on file. Clarissa Cardenas is a 79 y.o. female presenting to the ED for history of fall down steps, beginning short time ago. The complaint has been persistent, moderate in severity, and worsened by movement of right hip. Presenting here because of fall. Reports that she tripped and fell. She was using her slippers and fell down 3 steps. patient did strike her head. Patient reporting no chest pain or difficulty breathing she does complain of hip pain. Patient reporting no abdominal pain or chest pain she reports no shortness of breath. Patient reporting no nausea or vomiting. Patient has not any blood thinners. Patient does complain of back pain. Patient reports that she is does have back pain normally. Imaging shows a periprosthetic fracture of the right femur. Fracture fragments are displaced approximately 11 mm distally. Fracture extends near to the cephalad extent of the residual bone. Orthopedic service was consulted. Subjective  Patient seen and examined chart reviewed. Patient resting comfortably in bed not in acute distress    Exam:  BP (!) 160/68   Pulse 99   Temp 97.6 °F (36.4 °C) (Temporal)   Resp 17   Ht 5' 7\" (1.702 m)   Wt 191 lb (86.6 kg)   SpO2 93%   BMI 29.91 kg/m²   -General:  Awake, alert, oriented X 3. Well developed, well nourished, well groomed. No apparent distress. -HEENT:  Normocephalic, atraumatic. Pupils equal, round, reactive to light. No scleral icterus. No conjunctival injection. Normal lips, teeth, and gums. No nasal discharge. Neck:  Supple    -Heart:  RRR, no murmurs, gallops, rubs    -Lungs:  CTA bilaterally, bilat symmetrical expansion, no wheeze, rales, or rhonchi    -Abdomen:   Bowel sounds present, soft, nontender, no masses, no organomegaly, no peritoneal signs    -Extremities: normal ROM. No Cyanosis clubbing or edema    -Skin: Warm and dry    -Neuro:  AAO x 3 . Cranial nerves 2-12 intact, no focal deficits     -Psych : normal .    Medications:  Reviewed    Infusion Medications    sodium chloride      sodium chloride 75 mL/hr at 12/04/22 0655     Scheduled Medications    ceFAZolin  2,000 mg IntraVENous On Call to OR    sodium chloride flush  10 mL IntraVENous 2 times per day    methocarbamol  1,000 mg Oral 4x Daily    gabapentin  200 mg Oral TID     PRN Meds: morphine, sodium chloride flush, sodium chloride, promethazine **OR** ondansetron, polyethylene glycol, acetaminophen **OR** acetaminophen, oxyCODONE **OR** oxyCODONE    I/O    Intake/Output Summary (Last 24 hours) at 12/4/2022 1013  Last data filed at 12/4/2022 0410  Gross per 24 hour   Intake 10 ml   Output --   Net 10 ml       Labs:   Recent Labs     12/03/22 2042 12/04/22  0510   WBC 14.4* 11.2   HGB 12.7 11.8   HCT 38.0 36.1    246       Recent Labs     12/03/22 2042 12/04/22  0510    139   K 3.8 4.1    106   CO2 20* 21*   BUN 21 17   CREATININE 0.8 0.7   CALCIUM 9.0 8.7       Recent Labs     12/03/22 2042   PROT 7.1   ALKPHOS 102   ALT 14   AST 21   BILITOT 0.2       Recent Labs     12/03/22  2235   INR 1.0       No results for input(s): CKTOTAL, TROPONINI in the last 72 hours. Chronic labs:  Lab Results   Component Value Date    INR 1.0 12/03/2022       Radiology:  Imaging studies reviewed today. ASSESSMENT:    Principal Problem:    Mary-prosthetic fracture around prosthetic hip, initial encounter  Resolved Problems:    * No resolved hospital problems. *       Assessment/PLAN:    --Closed, Right Periprosthetic hip Fracture status post fall at home. Patient going for surgery today by orthopedic t surgery eam.  N.p.o. and pain control    --Sinus tachycardia likely due to pain.   KG essentially unremarkable, now resolved    --Hypertension likely worsened by pain due to above, close monitoring just as needed      Diet: Diet NPO  Code Status: Full Code  PT/OT Eval Status:     DVT Prophylaxis:     Recommended disposition at discharge:      +++++++++++++++++++++++++++++++++++++++++++++++++  Karlo Priest MD  12/4/2022  [facility].  +++++++++++++++++++++++++++++++++++++++++++++++++  NOTE: This report was transcribed using voice recognition software. Every effort was made to ensure accuracy; however, inadvertent computerized transcription errors may be present.

## 2022-12-04 NOTE — OP NOTE
Operative Note      Patient: Leonidas Patricio  YOB: 1955  MRN: [id number]    Date of Procedure: 12/4/2022    Pre-Op Diagnosis: RIGHT PERIPROSTHETIC FEMUR FRACTURE    Post-Op Diagnosis: Same       Procedure(s):  ORIF RIGHT PERIPROSTHETIC HIP FRACTURE    Surgeon(s):  Pratik Mccoy DO    Assistant:   Resident: hCerie Jarvis DO; Anne-Marie Montejo DO    Anesthesia: General    Estimated Blood Loss (mL): 937     Complications: None    Specimens:   * No specimens in log *    Implants:  Implant Name Type Inv. Item Serial No.  Lot No. LRB No. Used Action   CABLE SURG L750MM DIA1MM S STL SMOOTH W/ CRMP - [id number]  CABLE SURG L750MM DIA1MM S STL SMOOTH W/ CRMP  DEPUY SYN[employer] [place] [id number] Right 3 Implanted   PLATE BNE HK SM 3.3/1.3X812 MM RT FEM PROX 8 HOLE NS VA-LCP - [id number]  PLATE BNE HK SM 4.6/6.2A751 MM RT FEM PROX 8 HOLE NS VA-LCP  DEPUY SYNTHES [facility]  Right 1 Implanted   SCREW BNE L32MM DIA4. 5MM PROX YENIFER TIB S STL ST FACUNDO FULL - [id number]  SCREW BNE L32MM DIA4. 5MM PROX YENIFER TIB S STL ST FACUNDO FULL  DEPUY SYN[employer]  Right 1 Implanted   SCREW BNE L34MM DIA4. 5MM PROX YENIFER TIB S STL ST FACUNDO FULL - [id number]  SCREW BNE L34MM DIA4. 5MM PROX YENIFER TIB S STL ST FACUNDO FULL  DEPUY SYNTH[employer]  Right 1 Implanted   SCREW BNE L38MM DIA4. 5MM PROX YENIFER TIB S STL ST FACUNDO FULL - [id number]  SCREW BNE L38MM DIA4. 5MM PROX YENIFER TIB S STL ST FACUNDO FULL  DEPUY [employer]  Right 1 Implanted   SCREW BNE L28MM DIA3.5MM YENIFER S STL ST FULL THRD FACUNDO T15 - [id number]  SCREW BNE L28MM DIA3.5MM YENIFER S STL ST FULL THRD FACUNDO T15  DEPUY SYN[employer] [place]-  Right 1 Implanted   SCREW BNE L30MM DIA3.5MM YENIFER S STL ST FULL THRD FACUNDO T15 - [id number]  SCREW BNE L30MM DIA3.5MM YENIFER S STL ST FULL THRD FACUNDO T15  DEPUY SYN[employer]  Right 1 Implanted   3.5MM CORTEX SCREW      Right 1 Implanted   SCREW BNE L26MM DIA3. 5MM PROX TIB S STL ST FULL THRD T15 - [id number]  SCREW BNE L26MM DIA3. 5MM PROX TIB S STL ST FULL THRD T15  DEPUY [employer] [place]  Right 1 Implanted   SCREW BNE L75MM DIA3. 5MM PROX TIB S STL ST FULL THRD T15 - [id number]  SCREW BNE L75MM DIA3. 5MM PROX TIB S STL ST FULL THRD T15  DEPUY SY[employer]  Right 1 Implanted   SCREW BNE L10MM DIA5MM UNICORTICAL PERIPROSTHETIC S STL ST - [id number]  SCREW BNE L10MM DIA5MM UNICORTICAL PERIPROSTHETIC S STL ST  DEPUY SYNTHES [place]  Right 1 Implanted         Drains: * No LDAs found *      Detailed Description of Procedure:   Patient brought to the operative suite where she was placed on upper table supine position. She received a general anesthetic by the department esthesia well to thee [facility] intravenously. She is now carefully positioned in left lateral decubitus position axillary was placed a bony promises were carefully padded. Right lower extremity sterilely prepped and draped out in standard sterile fashion. Foot lower leg covered stockinette and Coban. Surgical timeout was performed per protocol by member surgical team.  We utilized the previous total hip incision and then extended this more anteriorly in line with the lateral aspect of the femoral shaft more distally. Skin was incised with a scalp electrocautery take this obtains tissues. Iliotibial band divided longitudinally of this fibers, able to identify the suture repair from the previous surgery. Deep retractors were placed within the wound. Fracture hematoma was suctioned, there was a spiral fracture about the previous THERESA stem with a separate sagittal plane fracture line anteriorly. We then divided the vastus fascia as well as the vastus muscle belly through the natural raphae exposing the lateral aspect of the femoral shaft. The fracture hematoma deep was then curetted rongeured and irrigated. The femoral stem could be identified now this was assessed and felt to be fixated to the proximal fracture fragment without any toggle with stress. We now openly reduced the spiral fractures with reduction clamps. Fluoroscopy confirmed appropriate duction. We then provisionally stabilized with 2 separate 1.0 mm cables these were tensioned crimped and cut. We now utilized a hook plate of appropriate length compresses over the trunk with some impaction and down to the lateral cortex. He fixated this with multiple bicortical screws distal to the fracture sites, we then shot some compression screws about the stem more proximally as well as an additional cable which was now around the plate. Unicortical locking screw was placed about the stem followed by 2 separate locking screws in the greater trochanter fragment. This point time he felt excellent reduction stabilization final images were taken saved to [employer] system. Hip was taken through passive range of motion felt to be stable. Pulsatile lavage was utilized for irrigation. Antibiotic powder was applied about the deep hardware. The vastus fascia was closed in a running fashion followed by the iliotibial band in interrupted fashion. Standard layered closure was utilized thereon out. Soft sterile dressings were applied. Patient was now positioned back in the supine position extubated uneventfully transfer onto the [facility] to the postanesthesia care in stable condition. Postoperative plans:  Patient admitted back to medical surgical hernandez. She received 24 hours of postop antibiotics. She will be started chemical DVT prophylaxis postoperative day 1. PT will evaluate her, she will be toe-touch weightbearing only on the right lower extremity. Once discharged from the hospital she will follow-up in orthopedic office in 2 weeks for repeat evaluation. Electronically signed by Delvin Walker DO on 12/4/2022     NOTE: This report was transcribed using voice recognition software.  Every effort was made to ensure accuracy; however, inadvertent computerized transcription errors may be present

## 2022-12-04 NOTE — ED PROVIDER NOTES
HPI:  12/3/22, Time: 7:57 PM MARINO Santiago is a 79 y.o. female presenting to the ED for history of fall down steps, beginning short time ago. The complaint has been persistent, moderate in severity, and worsened by movement of right hip. Presenting here because of fall. Reports that she tripped and fell. She was using her slippers and fell down 3 steps. patient did strike her head. Patient reporting no chest pain or difficulty breathing she does complain of hip pain. Patient reporting no abdominal pain or chest pain she reports no shortness of breath. Patient reporting no nausea or vomiting. Patient has not any blood thinners. Patient does complain of back pain. Patient reports that she is does have back pain normally. ROS:   Pertinent positives and negatives are stated within HPI, all other systems reviewed and are negative.  --------------------------------------------- PAST HISTORY ---------------------------------------------  Past Medical History:  has no past medical history on file. Past Surgical History:  has no past surgical history on file. Social History:      Family History: family history is not on file. The patients home medications have been reviewed. Allergies: Patient has no allergy information on record.    ---------------------------------------------------PHYSICAL EXAM--------------------------------------    Constitutional/General: Alert and oriented x3, mild distress  Head: Normocephalic tender posterior scalp  Eyes: PERRL, EOMI  Mouth: Oropharynx clear, handling secretions, no trismus  Neck: Supple, full ROM, non tender to palpation in the midline, no stridor, no crepitus, no meningeal signs  Pulmonary: Lungs clear to auscultation bilaterally, no wheezes, rales, or rhonchi. Not in respiratory distress  Cardiovascular:  Regular rate. Regular rhythm. No murmurs, gallops, or rubs. 2+ distal pulses  Chest: no chest wall tenderness  Abdomen: Soft. Non tender. Non distended. +BS. No rebound, guarding, or rigidity. No pulsatile masses appreciated. Musculoskeletal: Moves all extremities except for right hip range of motion limited secondary to pain the right leg is rotated outward pulses are intact distally she is able to move her foot. . Warm and well perfused, no clubbing, cyanosis, or edema. Capillary refill <3 seconds  Skin: warm and dry. No rashes. Neurologic: GCS 15, CN 2-12 grossly intact, no focal deficits, symmetric strength 5/5 in the upper, patient able to move left leg unable to move right leg secondary to pain. Psych: Normal Affect    -------------------------------------------------- RESULTS -------------------------------------------------  I have personally reviewed all laboratory and imaging results for this patient. Results are listed below.      LABS:  Results for orders placed or performed during the hospital encounter of 12/03/22   CBC with Auto Differential   Result Value Ref Range    WBC 14.4 (H) 4.5 - 11.5 E9/L    RBC 3.89 3.50 - 5.50 E12/L    Hemoglobin 12.7 11.5 - 15.5 g/dL    Hematocrit 38.0 34.0 - 48.0 %    MCV 97.7 80.0 - 99.9 fL    MCH 32.6 26.0 - 35.0 pg    MCHC 33.4 32.0 - 34.5 %    RDW 14.9 11.5 - 15.0 fL    Platelets 032 381 - 941 E9/L    MPV 11.0 7.0 - 12.0 fL    Neutrophils % 86.3 (H) 43.0 - 80.0 %    Immature Granulocytes % 0.6 0.0 - 5.0 %    Lymphocytes % 7.3 (L) 20.0 - 42.0 %    Monocytes % 5.1 2.0 - 12.0 %    Eosinophils % 0.3 0.0 - 6.0 %    Basophils % 0.4 0.0 - 2.0 %    Neutrophils Absolute 12.37 (H) 1.80 - 7.30 E9/L    Immature Granulocytes # 0.09 E9/L    Lymphocytes Absolute 1.05 (L) 1.50 - 4.00 E9/L    Monocytes Absolute 0.73 0.10 - 0.95 E9/L    Eosinophils Absolute 0.05 0.05 - 0.50 E9/L    Basophils Absolute 0.06 0.00 - 0.20 E9/L   Comprehensive Metabolic Panel   Result Value Ref Range    Sodium 140 132 - 146 mmol/L    Potassium 3.8 3.5 - 5.0 mmol/L    Chloride 105 98 - 107 mmol/L    CO2 20 (L) 22 - 29 mmol/L    Anion Gap 15 7 - 16 mmol/L    Glucose 171 (H) 74 - 99 mg/dL    BUN 21 6 - 23 mg/dL    Creatinine 0.8 0.5 - 1.0 mg/dL    Est, Glom Filt Rate >60 >=60 mL/min/1.73    Calcium 9.0 8.6 - 10.2 mg/dL    Total Protein 7.1 6.4 - 8.3 g/dL    Albumin 3.9 3.5 - 5.2 g/dL    Total Bilirubin 0.2 0.0 - 1.2 mg/dL    Alkaline Phosphatase 102 35 - 104 U/L    ALT 14 0 - 32 U/L    AST 21 0 - 31 U/L   Troponin   Result Value Ref Range    Troponin, High Sensitivity 9 0 - 9 ng/L   Protime-INR   Result Value Ref Range    Protime 11.2 9.3 - 12.4 sec    INR 1.0    APTT   Result Value Ref Range    aPTT 27.0 24.5 - 35.1 sec   EKG 12 Lead   Result Value Ref Range    Ventricular Rate 103 BPM    Atrial Rate 103 BPM    P-R Interval 154 ms    QRS Duration 76 ms    Q-T Interval 366 ms    QTc Calculation (Bazett) 479 ms    P Axis 67 degrees    R Axis -9 degrees    T Axis 45 degrees   TYPE AND SCREEN   Result Value Ref Range    ABO/Rh A NEG     Antibody Screen NEG        RADIOLOGY:  Interpreted by Radiologist.  CT HEAD WO CONTRAST   Final Result   HEAD:      No acute intracranial hemorrhage or mass effect. CERVICAL SPINE:      No acute fracture or traumatic malalignment. Degenerative changes result in multilevel moderate spinal stenosis and   multilevel high-grade neural foraminal stenosis. LUMBAR SPINE:      No acute fracture or traumatic malalignment. Scoliosis. Degenerative changes and scoliosis result in multilevel spinal stenosis,   greatest at L3-4 where it is moderate in degree. There is mild to moderate   multilevel neural foraminal stenosis, as detailed above. CT CERVICAL SPINE WO CONTRAST   Final Result   HEAD:      No acute intracranial hemorrhage or mass effect. CERVICAL SPINE:      No acute fracture or traumatic malalignment. Degenerative changes result in multilevel moderate spinal stenosis and   multilevel high-grade neural foraminal stenosis.       LUMBAR SPINE:      No acute fracture or traumatic malalignment. Scoliosis. Degenerative changes and scoliosis result in multilevel spinal stenosis,   greatest at L3-4 where it is moderate in degree. There is mild to moderate   multilevel neural foraminal stenosis, as detailed above. CT LUMBAR SPINE WO CONTRAST   Final Result   HEAD:      No acute intracranial hemorrhage or mass effect. CERVICAL SPINE:      No acute fracture or traumatic malalignment. Degenerative changes result in multilevel moderate spinal stenosis and   multilevel high-grade neural foraminal stenosis. LUMBAR SPINE:      No acute fracture or traumatic malalignment. Scoliosis. Degenerative changes and scoliosis result in multilevel spinal stenosis,   greatest at L3-4 where it is moderate in degree. There is mild to moderate   multilevel neural foraminal stenosis, as detailed above. CT HIP RIGHT WO CONTRAST   Final Result   1. Periprostatic fracture on the right. Fracture fragments are displaced   approximately 11 mm distally. Fracture extends near to the cephalad extent   of the residual bone. 2.  Osseous mineralization is decreased. 3.  Bilateral hip arthroplasty. 4.  Diverticulosis without evidence of diverticulitis. Atherosclerotic   disease. XR FEMUR RIGHT (MIN 2 VIEWS)   Final Result   Displaced, angulated fracture involving proximal right femur at level of   femoral component of right hip arthroplasty. XR CHEST PORTABLE   Final Result   Diffuse bilateral pulmonary interstitial opacities are nonspecific given   supine positioning; pulmonary vascular congestion could have this appearance. XR HIP 2-3 VW W PELVIS RIGHT   Final Result   Displaced, angulated fracture involving proximal right femur at level of   femoral component of right hip arthroplasty. EKG:  This EKG is signed and interpreted by me.     Rate: 103  Rhythm: Sinus tachycardia  Interpretation: no acute changes  Comparison: no previous EKG available        LACERATION REPAIR  PROCEDURE NOTE:  Unless otherwise indicated, this procedure was done or directly supervised by the ED attending. Laceration #: 1. Location: posterior scalp  Length:  0.5 cm. The wound area was cleansend with shur-clens and draped in a sterile fashion. The wound area was anesthetized with not required. WOUND COMPLEXITY:    Debridement: None. Undermining: None. Wound Margins Revised: None. Flaps Aligned: No.  The wound was explored with the following results no foreign body or tendon injury seen. The wound was closed with staples. Dressing:  bacitracin was placed. Total number staples: 1    ------------------------- NURSING NOTES AND VITALS REVIEWED ---------------------------   The nursing notes within the ED encounter and vital signs as below have been reviewed by myself. BP (!) 167/76   Pulse (!) 105   Temp 98 °F (36.7 °C)   Resp 18   Wt 190 lb (86.2 kg)   SpO2 93%   Oxygen Saturation Interpretation: Normal    The patients available past medical records and past encounters were reviewed.         ------------------------------ ED COURSE/MEDICAL DECISION MAKING----------------------  Medications   methocarbamol (ROBAXIN) tablet 1,000 mg (has no administration in time range)   gabapentin (NEURONTIN) capsule 200 mg (has no administration in time range)   oxyCODONE (ROXICODONE) immediate release tablet 5 mg (has no administration in time range)     Or   oxyCODONE HCl (OXY-IR) immediate release tablet 10 mg (has no administration in time range)   acetaminophen (TYLENOL) tablet 650 mg (has no administration in time range)   bacitracin zinc ointment (has no administration in time range)   ondansetron (ZOFRAN) injection 4 mg (4 mg IntraVENous Given 12/3/22 1935)   morphine sulfate (PF) injection 4 mg (4 mg IntraVENous Given 12/3/22 2016)   morphine sulfate (PF) injection 4 mg (4 mg IntraVENous Given 12/3/22 2153)   diptheria-tetanus toxoids ([facility]) 2-2 LF/0.5ML injection 0.5 mL (0.5 mLs IntraMUSCular Given 12/3/22 4385)             Medical Decision Making:   Patient presenting here after fall down steps. Patient did trip and fall down steps. patient did strike her head. Patient reporting hip pain. Patient reports she is not on anticoagulation. Patient reporting no chest pain or difficulty breathing. Patient right leg was rotated outward. Patient labs and EKG done due to her fall and fracture. Patient x-rays and CTs noted and reviewed. X-ray does show proximal femur fracture. Patient had orthopedic evaluation. Patient will be admitted medically with orthopedic to consult. Patient did undergo CTs of her head and neck as well as her lumbar spine due to her fall. It was to rule out any intracranial hemorrhage and/or fracture to her neck and back due to the distracting injury of her right leg. Patient did receive morphine IV x2 here in the emergency department. there is no acute findings noted on CT. Patient was made aware of findings and plan. Patient will be admitted     Re-Evaluations:  Patient reevaluated multiple times here in the emergency room. Patient did receive morphine IV x2. Patient still having pain. Patient was made aware of results and plan. Patient will be admitted. Consultations:         Internal medicine as well as orthopedic        Critical Care: This patient's ED course included: a personal history and physicial eaxmination    This patient has been closely monitored during their ED course. Counseling: The emergency provider has spoken with the patient and discussed todays results, in addition to providing specific details for the plan of care and counseling regarding the diagnosis and prognosis. Questions are answered at this time and they are agreeable with the plan.       --------------------------------- IMPRESSION AND DISPOSITION ---------------------------------    IMPRESSION  1.  Injury of head, initial encounter    2. Laceration of scalp, initial encounter    3. Lumbar back pain    4. Closed fracture of proximal end of femur ([facility] [address]        DISPOSITION  Disposition: Admit to med/surg floor  Patient condition is stable        NOTE: This report was transcribed using voice recognition software.  Every effort was made to ensure accuracy; however, inadvertent computerized transcription errors may be present          Mitzi Carmichael MD  12/04/22 8430       Mitzi Carmichael MD  12/04/22 8325

## 2022-12-04 NOTE — H&P
Hospitalist History & Physical      PCP: No primary care provider on file. Date of Service: Pt seen/examined on 12/3/2022    Chief Complaint:  had concerns including Fall (Fall down 3 steps, hit head, deformed femur, - loc, -thinners, fentanyl given ). History Of Present Illness:    Ms. Landy Martin, a 79y.o. year old female  who  has no past medical history on file. Landy Martin is a 79 y.o. female presenting to the ED for history of fall down steps, beginning short time ago. The complaint has been persistent, moderate in severity, and worsened by movement of right hip. Presenting here because of fall. Reports that she tripped and fell. She was using her slippers and fell down 3 steps. patient did strike her head. Patient reporting no chest pain or difficulty breathing she does complain of hip pain. Patient reporting no abdominal pain or chest pain she reports no shortness of breath. Patient reporting no nausea or vomiting. Patient has not any blood thinners. Patient does complain of back pain. Patient reports that she is does have back pain normally. Imaging shows a periprosthetic fracture of the right femur. Fracture fragments are displaced approximately 11 mm distally. Fracture extends near to the cephalad extent of the residual bone. Orthopedic service was consulted. History reviewed. No pertinent past medical history. History reviewed. No pertinent surgical history. Prior to Admission medications    Not on File         Allergies:  Patient has no allergy information on record. Social History:    TOBACCO:   has no history on file for tobacco use. ETOH:   has no history on file for alcohol use. Family History:    Reviewed in detail and negative for DM, CAD, Cancer, CVA. Positive as follows\"  History reviewed. No pertinent family history. REVIEW OF SYSTEMS:   Pertinent positives as noted in the HPI. All other systems reviewed and negative.     PHYSICAL EXAM:  BP (!) 167/76   Pulse (!) 105   Temp 98 °F (36.7 °C)   Resp 18   Wt 190 lb (86.2 kg)   SpO2 93%   General appearance: No apparent distress, appears stated age and cooperative. HEENT: Normal cephalic, atraumatic without obvious deformity. Pupils equal, round, and reactive to light. Extra ocular muscles intact. Conjunctivae/corneas clear. Neck: Supple, with full range of motion. No jugular venous distention. Trachea midline. Respiratory: CTA  Cardiovascular: Tachycardic  Abdomen: S/NT/ND  Musculoskeletal: No clubbing, cyanosis, edema of bilateral lower extremities. Brisk capillary refill. Skin: Normal skin color. No rashes or lesions. Neurologic:  Neurovascularly intact without any focal sensory/motor deficits. Cranial nerves: II-XII intact, grossly non-focal.  Psychiatric: Alert and oriented, thought content appropriate, normal insight    Reviewed EKG and CXR personally      CBC:   Recent Labs     12/03/22 2042   WBC 14.4*   RBC 3.89   HGB 12.7   HCT 38.0   MCV 97.7   RDW 14.9        BMP:   Recent Labs     12/03/22 2042      K 3.8      CO2 20*   BUN 21   CREATININE 0.8     LFT:  Recent Labs     12/03/22 2042   PROT 7.1   ALKPHOS 102   ALT 14   AST 21   BILITOT 0.2     CE:  No results for input(s): Driss Lee in the last 72 hours. PT/INR:   Recent Labs     12/03/22  2235   INR 1.0   APTT 27.0     BNP: No results for input(s): BNP in the last 72 hours.   ESR: No results found for: SEDRATE  CRP: No results found for: CRP  D Dimer: No results found for: DDIMER   Folate and B12: No results found for: OUBPCKXT38, No results found for: FOLATE  Lactic Acid: No results found for: LACTA  Thyroid Studies: No results found for: TSH, X4ICMQE, E3ELEXB, THYROIDAB    Oupatient labs:  Lab Results   Component Value Date    INR 1.0 12/03/2022       Urinalysis:  No results found for: NITRU, WBCUA, BACTERIA, RBCUA, BLOODU, SPECGRAV, GLUCOSEU    Imaging:  XR FEMUR RIGHT (MIN 2 VIEWS)    Result Date: 12/3/2022  EXAMINATION: ONE XRAY VIEW OF THE PELVIS AND TWO XRAY VIEWS RIGHT HIP;   XRAY VIEWS OF THE RIGHT FEMUR 12/3/2022 8:52 pm COMPARISON: None. HISTORY: ORDERING SYSTEM PROVIDED HISTORY: fall, pain, r/o fx TECHNOLOGIST PROVIDED HISTORY: Reason for exam:->fall, pain, r/o fx What reading provider will be dictating this exam?->CRC FINDINGS: There is a right hip arthroplasty. There is a fracture involving the proximal right femoral diaphysis with angulation and overlap. No dislocation of the arthroplasty at level of the hip. No evidence of fracture involving the distal right femur. No evidence of pelvis fracture. Left hip arthroplasty is also visualized. Displaced, angulated fracture involving proximal right femur at level of femoral component of right hip arthroplasty. CT HEAD WO CONTRAST    Result Date: 12/3/2022  EXAMINATION: CT OF THE HEAD WITHOUT CONTRAST; CT OF THE LUMBAR SPINE WITHOUT CONTRAST; CT OF THE CERVICAL SPINE WITHOUT CONTRAST  12/3/2022 6:31 pm TECHNIQUE: CT of the head was performed without the administration of intravenous contrast. Automated exposure control, iterative reconstruction, and/or weight based adjustment of the mA/kV was utilized to reduce the radiation dose to as low as reasonably achievable.; CT of the lumbar spine was performed without the administration of intravenous contrast. Multiplanar reformatted images are provided for review. Adjustment of mA and/or kV according to patient size was utilized. Automated exposure control, iterative reconstruction, and/or weight based adjustment of the mA/kV was utilized to reduce the radiation dose to as low as reasonably achievable.; CT of the cervical spine was performed without the administration of intravenous contrast. Multiplanar reformatted images are provided for review.  Automated exposure control, iterative reconstruction, and/or weight based adjustment of the mA/kV was utilized to reduce the radiation dose to as low as reasonably achievable. COMPARISON: None. HISTORY: ORDERING SYSTEM PROVIDED HISTORY: Evaluate intracranial abnormality TECHNOLOGIST PROVIDED HISTORY: Has a \"code stroke\" or \"stroke alert\" been called? ->No Reason for exam:->Evaluate intracranial abnormality Decision Support Exception - unselect if not a suspected or confirmed emergency medical condition->Emergency Medical Condition ([place]) What reading provider will be dictating this exam?->CRC FINDINGS: HEAD: Parenchyma: No evidence of acute intracranial hemorrhage or mass effect. Ventricles: No evidence of hydrocephalus. Calvarium: No acute calvarial fracture is seen. CERVICAL SPINE: Bones: Vertebral body heights are maintained. Grade 1 degenerative anterolisthesis of C3 on C4, grade 1 degenerative retrolisthesis of C4 on C5, grade 1 degenerative retrolisthesis of C6 on C7, and grade 1 degenerative anterolisthesis of C7 on T1. No evidence of acute fracture. Multilevel degenerative changes cervical discs, facet joints, and uncovertebral joints. Degenerative changes of the atlantoaxial articulation. Multilevel at least moderate spinal stenosis. Multilevel high-grade neural foraminal stenosis. Prevertebral/other: No significant prevertebral soft tissue swelling. Atherosclerotic disease. LUMBAR SPINE: Bones: There are 6 non rib-bearing block like vertebrae. For the purposes of this exam, the lowest block like vertebrae is referred to as L5. Vertebral body heights are maintained. Grade 1 degenerative retrolisthesis of L1 on L2 and L3 on L4. Scoliosis. Right L5 pars defect without spondylolisthesis. No evidence of acute fracture. Paraspinal/other: No paraspinal soft tissue abnormality is seen. Atherosclerotic disease. Colonic diverticulosis. Left renal cyst. Spinal Canal/Neuroforamina: Note that CT is inferior to MRI for the evaluation of spinal/neural foraminal stenosis. T12-L1: No significant spinal or neural foraminal stenosis are seen.  L1-L2: Minimal spinal stenosis secondary to disc bulge, scoliosis, ligamentum flavum thickening, and facet hypertrophy. No significant neural foraminal stenosis. L2-L3: Mild to moderate spinal stenosis secondary to scoliosis, disc osteophyte complex, ligamentum flavum thickening, and facet hypertrophy. Moderate left neural foraminal stenosis. L3-L4: Moderate spinal stenosis secondary to disc bulge, scoliosis, membrane thickening, and facet hypertrophy. Mild right neural foraminal stenosis. L4-L5: Mild-to-moderate spinal stenosis secondary to disc bulge, scoliosis, ligament flavum thickening, and facet hypertrophy. Moderate right neural foraminal stenosis. L5-S1: No significant spinal stenosis. Stenosis of the left subarticular zone and mild left neural foraminal stenosis secondary to disc bulge and facet hypertrophy. HEAD: No acute intracranial hemorrhage or mass effect. CERVICAL SPINE: No acute fracture or traumatic malalignment. Degenerative changes result in multilevel moderate spinal stenosis and multilevel high-grade neural foraminal stenosis. LUMBAR SPINE: No acute fracture or traumatic malalignment. Scoliosis. Degenerative changes and scoliosis result in multilevel spinal stenosis, greatest at L3-4 where it is moderate in degree. There is mild to moderate multilevel neural foraminal stenosis, as detailed above. CT CERVICAL SPINE WO CONTRAST    Result Date: 12/3/2022  EXAMINATION: CT OF THE HEAD WITHOUT CONTRAST; CT OF THE LUMBAR SPINE WITHOUT CONTRAST; CT OF THE CERVICAL SPINE WITHOUT CONTRAST  12/3/2022 6:31 pm TECHNIQUE: CT of the head was performed without the administration of intravenous contrast. Automated exposure control, iterative reconstruction, and/or weight based adjustment of the mA/kV was utilized to reduce the radiation dose to as low as reasonably achievable.; CT of the lumbar spine was performed without the administration of intravenous contrast. Multiplanar reformatted images are provided for review. Adjustment of mA and/or kV according to patient size was utilized. Automated exposure control, iterative reconstruction, and/or weight based adjustment of the mA/kV was utilized to reduce the radiation dose to as low as reasonably achievable.; CT of the cervical spine was performed without the administration of intravenous contrast. Multiplanar reformatted images are provided for review. Automated exposure control, iterative reconstruction, and/or weight based adjustment of the mA/kV was utilized to reduce the radiation dose to as low as reasonably achievable. COMPARISON: None. HISTORY: ORDERING SYSTEM PROVIDED HISTORY: Evaluate intracranial abnormality TECHNOLOGIST PROVIDED HISTORY: Has a \"code stroke\" or \"stroke alert\" been called? ->No Reason for exam:->Evaluate intracranial abnormality Decision Support Exception - unselect if not a suspected or confirmed emergency medical condition->Emergency Medical Condition ([place]) What reading provider will be dictating this exam?->CRC FINDINGS: HEAD: Parenchyma: No evidence of acute intracranial hemorrhage or mass effect. Ventricles: No evidence of hydrocephalus. Calvarium: No acute calvarial fracture is seen. CERVICAL SPINE: Bones: Vertebral body heights are maintained. Grade 1 degenerative anterolisthesis of C3 on C4, grade 1 degenerative retrolisthesis of C4 on C5, grade 1 degenerative retrolisthesis of C6 on C7, and grade 1 degenerative anterolisthesis of C7 on T1. No evidence of acute fracture. Multilevel degenerative changes cervical discs, facet joints, and uncovertebral joints. Degenerative changes of the atlantoaxial articulation. Multilevel at least moderate spinal stenosis. Multilevel high-grade neural foraminal stenosis. Prevertebral/other: No significant prevertebral soft tissue swelling. Atherosclerotic disease. LUMBAR SPINE: Bones: There are 6 non rib-bearing block like vertebrae.   For the purposes of this exam, the lowest block like vertebrae is referred to as L5. Vertebral body heights are maintained. Grade 1 degenerative retrolisthesis of L1 on L2 and L3 on L4. Scoliosis. Right L5 pars defect without spondylolisthesis. No evidence of acute fracture. Paraspinal/other: No paraspinal soft tissue abnormality is seen. Atherosclerotic disease. Colonic diverticulosis. Left renal cyst. Spinal Canal/Neuroforamina: Note that CT is inferior to MRI for the evaluation of spinal/neural foraminal stenosis. T12-L1: No significant spinal or neural foraminal stenosis are seen. L1-L2: Minimal spinal stenosis secondary to disc bulge, scoliosis, ligamentum flavum thickening, and facet hypertrophy. No significant neural foraminal stenosis. L2-L3: Mild to moderate spinal stenosis secondary to scoliosis, disc osteophyte complex, ligamentum flavum thickening, and facet hypertrophy. Moderate left neural foraminal stenosis. L3-L4: Moderate spinal stenosis secondary to disc bulge, scoliosis, membrane thickening, and facet hypertrophy. Mild right neural foraminal stenosis. L4-L5: Mild-to-moderate spinal stenosis secondary to disc bulge, scoliosis, ligament flavum thickening, and facet hypertrophy. Moderate right neural foraminal stenosis. L5-S1: No significant spinal stenosis. Stenosis of the left subarticular zone and mild left neural foraminal stenosis secondary to disc bulge and facet hypertrophy. HEAD: No acute intracranial hemorrhage or mass effect. CERVICAL SPINE: No acute fracture or traumatic malalignment. Degenerative changes result in multilevel moderate spinal stenosis and multilevel high-grade neural foraminal stenosis. LUMBAR SPINE: No acute fracture or traumatic malalignment. Scoliosis. Degenerative changes and scoliosis result in multilevel spinal stenosis, greatest at L3-4 where it is moderate in degree. There is mild to moderate multilevel neural foraminal stenosis, as detailed above.      CT LUMBAR SPINE WO CONTRAST    Result Date: 12/3/2022  EXAMINATION: CT OF THE HEAD WITHOUT CONTRAST; CT OF THE LUMBAR SPINE WITHOUT CONTRAST; CT OF THE CERVICAL SPINE WITHOUT CONTRAST  12/3/2022 6:31 pm TECHNIQUE: CT of the head was performed without the administration of intravenous contrast. Automated exposure control, iterative reconstruction, and/or weight based adjustment of the mA/kV was utilized to reduce the radiation dose to as low as reasonably achievable.; CT of the lumbar spine was performed without the administration of intravenous contrast. Multiplanar reformatted images are provided for review. Adjustment of mA and/or kV according to patient size was utilized. Automated exposure control, iterative reconstruction, and/or weight based adjustment of the mA/kV was utilized to reduce the radiation dose to as low as reasonably achievable.; CT of the cervical spine was performed without the administration of intravenous contrast. Multiplanar reformatted images are provided for review. Automated exposure control, iterative reconstruction, and/or weight based adjustment of the mA/kV was utilized to reduce the radiation dose to as low as reasonably achievable. COMPARISON: None. HISTORY: ORDERING SYSTEM PROVIDED HISTORY: Evaluate intracranial abnormality TECHNOLOGIST PROVIDED HISTORY: Has a \"code stroke\" or \"stroke alert\" been called? ->No Reason for exam:->Evaluate intracranial abnormality Decision Support Exception - unselect if not a suspected or confirmed emergency medical condition->Emergency Medical Condition ([place]) What reading provider will be dictating this exam?->CRC FINDINGS: HEAD: Parenchyma: No evidence of acute intracranial hemorrhage or mass effect. Ventricles: No evidence of hydrocephalus. Calvarium: No acute calvarial fracture is seen. CERVICAL SPINE: Bones: Vertebral body heights are maintained.   Grade 1 degenerative anterolisthesis of C3 on C4, grade 1 degenerative retrolisthesis of C4 on C5, grade 1 degenerative retrolisthesis of C6 on C7, and grade 1 degenerative anterolisthesis of C7 on T1. No evidence of acute fracture. Multilevel degenerative changes cervical discs, facet joints, and uncovertebral joints. Degenerative changes of the atlantoaxial articulation. Multilevel at least moderate spinal stenosis. Multilevel high-grade neural foraminal stenosis. Prevertebral/other: No significant prevertebral soft tissue swelling. Atherosclerotic disease. LUMBAR SPINE: Bones: There are 6 non rib-bearing block like vertebrae. For the purposes of this exam, the lowest block like vertebrae is referred to as L5. Vertebral body heights are maintained. Grade 1 degenerative retrolisthesis of L1 on L2 and L3 on L4. Scoliosis. Right L5 pars defect without spondylolisthesis. No evidence of acute fracture. Paraspinal/other: No paraspinal soft tissue abnormality is seen. Atherosclerotic disease. Colonic diverticulosis. Left renal cyst. Spinal Canal/Neuroforamina: Note that CT is inferior to MRI for the evaluation of spinal/neural foraminal stenosis. T12-L1: No significant spinal or neural foraminal stenosis are seen. L1-L2: Minimal spinal stenosis secondary to disc bulge, scoliosis, ligamentum flavum thickening, and facet hypertrophy. No significant neural foraminal stenosis. L2-L3: Mild to moderate spinal stenosis secondary to scoliosis, disc osteophyte complex, ligamentum flavum thickening, and facet hypertrophy. Moderate left neural foraminal stenosis. L3-L4: Moderate spinal stenosis secondary to disc bulge, scoliosis, membrane thickening, and facet hypertrophy. Mild right neural foraminal stenosis. L4-L5: Mild-to-moderate spinal stenosis secondary to disc bulge, scoliosis, ligament flavum thickening, and facet hypertrophy. Moderate right neural foraminal stenosis. L5-S1: No significant spinal stenosis. Stenosis of the left subarticular zone and mild left neural foraminal stenosis secondary to disc bulge and facet hypertrophy.      HEAD: No acute intracranial hemorrhage or mass effect. CERVICAL SPINE: No acute fracture or traumatic malalignment. Degenerative changes result in multilevel moderate spinal stenosis and multilevel high-grade neural foraminal stenosis. LUMBAR SPINE: No acute fracture or traumatic malalignment. Scoliosis. Degenerative changes and scoliosis result in multilevel spinal stenosis, greatest at L3-4 where it is moderate in degree. There is mild to moderate multilevel neural foraminal stenosis, as detailed above. XR CHEST PORTABLE    Result Date: 12/3/2022  EXAMINATION: ONE XRAY VIEW OF THE CHEST 12/3/2022 5:52 pm COMPARISON: None. HISTORY: ORDERING SYSTEM PROVIDED HISTORY: pain fall TECHNOLOGIST PROVIDED HISTORY: Reason for exam:->pain fall What reading provider will be dictating this exam?->CRC FINDINGS: Diffuse pulmonary interstitial opacities. No evidence of pneumothorax or significant pleural effusion. The heart is not significantly enlarged. No acute displaced rib fractures are seen. Degenerative changes of the spine, shoulders, and AC joints. Diffuse bilateral pulmonary interstitial opacities are nonspecific given supine positioning; pulmonary vascular congestion could have this appearance. CT HIP RIGHT WO CONTRAST    Result Date: 12/3/2022  EXAMINATION: CT OF THE RIGHT HIP WITHOUT CONTRAST 12/3/2022 9:31 pm TECHNIQUE: CT of the right hip was performed without the administration of intravenous contrast.  Multiplanar reformatted images are provided for review. Automated exposure control, iterative reconstruction, and/or weight based adjustment of the mA/kV was utilized to reduce the radiation dose to as low as reasonably achievable. COMPARISON: None.  HISTORY ORDERING SYSTEM PROVIDED HISTORY: periprosthetic hip fracture TECHNOLOGIST PROVIDED HISTORY: Reason for exam:->periprosthetic hip fracture Decision Support Exception - unselect if not a suspected or confirmed emergency medical condition->Emergency Medical Condition ([place]) What reading provider will be dictating this exam?->CRC FINDINGS: Bones: Bilateral hip prostheses. Osseous mineralization is decreased. The superior and inferior pubic rami appear intact. Pubic symphysis is congruent. Periprostatic fracture. The distal aspect of the hip prosthesis projects along the outer cortex of the distal fracture fragment as seen on series 5, image 47. The fracture extends cephalad near the proximal portion of the residual bone as seen on series 5, image 52. Fracture fragments are displaced distally approximately 11 mm. Soft Tissue: Advanced atherosclerotic disease in the imaged distal abdominal aorta and branch vessels. There is no aneurysmal dilatation. Diverticulosis without evidence of diverticulitis. Remaining imaged intra-and intrapelvic structures appear grossly unremarkable. Soft tissue stranding adjacent to the right hip with some muscular edema probably related to recent injury. Joint: There is no joint effusion on either side. Degenerative changes of the lumbar spine. 1.  Periprostatic fracture on the right. Fracture fragments are displaced approximately 11 mm distally. Fracture extends near to the cephalad extent of the residual bone. 2.  Osseous mineralization is decreased. 3.  Bilateral hip arthroplasty. 4.  Diverticulosis without evidence of diverticulitis. Atherosclerotic disease. XR HIP 2-3 VW W PELVIS RIGHT    Result Date: 12/3/2022  EXAMINATION: ONE XRAY VIEW OF THE PELVIS AND TWO XRAY VIEWS RIGHT HIP;   XRAY VIEWS OF THE RIGHT FEMUR 12/3/2022 8:52 pm COMPARISON: None. HISTORY: ORDERING SYSTEM PROVIDED HISTORY: fall, pain, r/o fx TECHNOLOGIST PROVIDED HISTORY: Reason for exam:->fall, pain, r/o fx What reading provider will be dictating this exam?->CRC FINDINGS: There is a right hip arthroplasty. There is a fracture involving the proximal right femoral diaphysis with angulation and overlap. No dislocation of the arthroplasty at level of the hip.   No evidence of fracture involving the distal right femur. No evidence of pelvis fracture. Left hip arthroplasty is also visualized. Displaced, angulated fracture involving proximal right femur at level of femoral component of right hip arthroplasty. ASSESSMENT:  -Right closed periprosthetic hip fracture  -Fall at home  -Hypertension  -Sinus tachycardia      PLAN:  -Admit to medicine  -Orthopedic service following  -OR tomorrow  -N.p.o. after midnight  -Pain management  -Normal saline 75 mL/h    This patient is a [place] surgical risk class I with a 1% risk of perioperative complications. No contraindications to surgery notified. Diet: Diet NPO  Code Status: No Order  Surrogate decision maker confirmed with patient:   Extended Emergency Contact Information  Primary Emergency Contact: kwabena caraballo  Home Phone: [phone]  Relation: Child  Preferred language: English   needed? No    DVT Prophylaxis: []Lovenox []Heparin []PCD [] [address] []Encouraged ambulation  Disposition: []Med/Surg [] Intermediate [] ICU/CCU  Admit status: [] Observation [] Inpatient     +++++++++++++++++++++++++++++++++++++++++++++++++  Bettylou Sicks, DO  +++++++++++++++++++++++++++++++++++++++++++++++++  NOTE: This report was transcribed using voice recognition software. Every effort was made to ensure accuracy; however, inadvertent computerized transcription errors may be present.

## 2022-12-05 ENCOUNTER — APPOINTMENT (OUTPATIENT)
Dept: GENERAL RADIOLOGY | Age: 67
DRG: 480 | End: 2022-12-05
Payer: MEDICARE

## 2022-12-05 LAB
ADENOVIRUS BY PCR: NOT DETECTED
ALBUMIN SERPL-MCNC: 3.1 G/DL (ref 3.5–5.2)
ALP BLD-CCNC: 70 U/L (ref 35–104)
ALT SERPL-CCNC: 11 U/L (ref 0–32)
ANION GAP SERPL CALCULATED.3IONS-SCNC: 8 MMOL/L (ref 7–16)
AST SERPL-CCNC: 24 U/L (ref 0–31)
BASOPHILS ABSOLUTE: 0.02 E9/L (ref 0–0.2)
BASOPHILS RELATIVE PERCENT: 0.2 % (ref 0–2)
BILIRUB SERPL-MCNC: 0.2 MG/DL (ref 0–1.2)
BORDETELLA PARAPERTUSSIS BY PCR: NOT DETECTED
BORDETELLA PERTUSSIS BY PCR: NOT DETECTED
BUN BLDV-MCNC: 15 MG/DL (ref 6–23)
CALCIUM SERPL-MCNC: 8.5 MG/DL (ref 8.6–10.2)
CHLAMYDOPHILIA PNEUMONIAE BY PCR: NOT DETECTED
CHLORIDE BLD-SCNC: 110 MMOL/L (ref 98–107)
CO2: 23 MMOL/L (ref 22–29)
CORONAVIRUS 229E BY PCR: NOT DETECTED
CORONAVIRUS HKU1 BY PCR: NOT DETECTED
CORONAVIRUS NL63 BY PCR: NOT DETECTED
CORONAVIRUS OC43 BY PCR: NOT DETECTED
CREAT SERPL-MCNC: 0.7 MG/DL (ref 0.5–1)
EKG ATRIAL RATE: 103 BPM
EKG P AXIS: 67 DEGREES
EKG P-R INTERVAL: 154 MS
EKG Q-T INTERVAL: 366 MS
EKG QRS DURATION: 76 MS
EKG QTC CALCULATION (BAZETT): 479 MS
EKG R AXIS: -9 DEGREES
EKG T AXIS: 45 DEGREES
EKG VENTRICULAR RATE: 103 BPM
EOSINOPHILS ABSOLUTE: 0 E9/L (ref 0.05–0.5)
EOSINOPHILS RELATIVE PERCENT: 0 % (ref 0–6)
GFR SERPL CREATININE-BSD FRML MDRD: >60 ML/MIN/1.73
GLUCOSE BLD-MCNC: 125 MG/DL (ref 74–99)
HCT VFR BLD CALC: 28.3 % (ref 34–48)
HEMOGLOBIN: 9.1 G/DL (ref 11.5–15.5)
HUMAN METAPNEUMOVIRUS BY PCR: NOT DETECTED
HUMAN RHINOVIRUS/ENTEROVIRUS BY PCR: NOT DETECTED
IMMATURE GRANULOCYTES #: 0.05 E9/L
IMMATURE GRANULOCYTES %: 0.4 % (ref 0–5)
INFLUENZA A BY PCR: NOT DETECTED
INFLUENZA B BY PCR: NOT DETECTED
L. PNEUMOPHILA SEROGP 1 UR AG: NORMAL
LYMPHOCYTES ABSOLUTE: 1.12 E9/L (ref 1.5–4)
LYMPHOCYTES RELATIVE PERCENT: 9.6 % (ref 20–42)
MAGNESIUM: 2.1 MG/DL (ref 1.6–2.6)
MCH RBC QN AUTO: 32.5 PG (ref 26–35)
MCHC RBC AUTO-ENTMCNC: 32.2 % (ref 32–34.5)
MCV RBC AUTO: 101.1 FL (ref 80–99.9)
MONOCYTES ABSOLUTE: 0.99 E9/L (ref 0.1–0.95)
MONOCYTES RELATIVE PERCENT: 8.4 % (ref 2–12)
MYCOPLASMA PNEUMONIAE BY PCR: NOT DETECTED
NEUTROPHILS ABSOLUTE: 9.54 E9/L (ref 1.8–7.3)
NEUTROPHILS RELATIVE PERCENT: 81.4 % (ref 43–80)
PARAINFLUENZA VIRUS 1 BY PCR: NOT DETECTED
PARAINFLUENZA VIRUS 2 BY PCR: NOT DETECTED
PARAINFLUENZA VIRUS 3 BY PCR: NOT DETECTED
PARAINFLUENZA VIRUS 4 BY PCR: NOT DETECTED
PDW BLD-RTO: 15.2 FL (ref 11.5–15)
PLATELET # BLD: 217 E9/L (ref 130–450)
PMV BLD AUTO: 10.8 FL (ref 7–12)
POTASSIUM SERPL-SCNC: 4.2 MMOL/L (ref 3.5–5)
PROCALCITONIN: 0.09 NG/ML (ref 0–0.08)
RBC # BLD: 2.8 E12/L (ref 3.5–5.5)
RESPIRATORY SYNCYTIAL VIRUS BY PCR: NOT DETECTED
SARS-COV-2, PCR: NOT DETECTED
SODIUM BLD-SCNC: 141 MMOL/L (ref 132–146)
STREP PNEUMONIAE ANTIGEN, URINE: NORMAL
TOTAL PROTEIN: 5.4 G/DL (ref 6.4–8.3)
WBC # BLD: 11.7 E9/L (ref 4.5–11.5)

## 2022-12-05 PROCEDURE — 87081 CULTURE SCREEN ONLY: CPT

## 2022-12-05 PROCEDURE — 85025 COMPLETE CBC W/AUTO DIFF WBC: CPT

## 2022-12-05 PROCEDURE — 6360000002 HC RX W HCPCS: Performed by: PHYSICAL THERAPY ASSISTANT

## 2022-12-05 PROCEDURE — 2700000000 HC OXYGEN THERAPY PER DAY

## 2022-12-05 PROCEDURE — 1200000000 HC SEMI PRIVATE

## 2022-12-05 PROCEDURE — 84145 PROCALCITONIN (PCT): CPT

## 2022-12-05 PROCEDURE — 71045 X-RAY EXAM CHEST 1 VIEW: CPT

## 2022-12-05 PROCEDURE — 6370000000 HC RX 637 (ALT 250 FOR IP): Performed by: PHYSICAL THERAPY ASSISTANT

## 2022-12-05 PROCEDURE — 83735 ASSAY OF MAGNESIUM: CPT

## 2022-12-05 PROCEDURE — 6370000000 HC RX 637 (ALT 250 FOR IP): Performed by: FAMILY MEDICINE

## 2022-12-05 PROCEDURE — 2580000003 HC RX 258: Performed by: PHYSICAL THERAPY ASSISTANT

## 2022-12-05 PROCEDURE — 80053 COMPREHEN METABOLIC PANEL: CPT

## 2022-12-05 PROCEDURE — 87040 BLOOD CULTURE FOR BACTERIA: CPT

## 2022-12-05 PROCEDURE — 87449 NOS EACH ORGANISM AG IA: CPT

## 2022-12-05 PROCEDURE — 93010 ELECTROCARDIOGRAM REPORT: CPT | Performed by: INTERNAL MEDICINE

## 2022-12-05 PROCEDURE — 36415 COLL VENOUS BLD VENIPUNCTURE: CPT

## 2022-12-05 PROCEDURE — 0202U NFCT DS 22 TRGT SARS-COV-2: CPT

## 2022-12-05 RX ORDER — POLYETHYLENE GLYCOL 3350 17 G/17G
17 POWDER, FOR SOLUTION ORAL ONCE
Status: DISCONTINUED | OUTPATIENT
Start: 2022-12-05 | End: 2022-12-07 | Stop reason: HOSPADM

## 2022-12-05 RX ORDER — POLYETHYLENE GLYCOL 3350 17 G/17G
17 POWDER, FOR SOLUTION ORAL 2 TIMES DAILY PRN
Status: DISCONTINUED | OUTPATIENT
Start: 2022-12-05 | End: 2022-12-07 | Stop reason: HOSPADM

## 2022-12-05 RX ORDER — DOXYCYCLINE HYCLATE 100 MG/1
100 CAPSULE ORAL EVERY 12 HOURS SCHEDULED
Status: DISCONTINUED | OUTPATIENT
Start: 2022-12-05 | End: 2022-12-07 | Stop reason: HOSPADM

## 2022-12-05 RX ORDER — DOCUSATE SODIUM 100 MG/1
100 CAPSULE, LIQUID FILLED ORAL 2 TIMES DAILY
Status: DISCONTINUED | OUTPATIENT
Start: 2022-12-05 | End: 2022-12-07 | Stop reason: HOSPADM

## 2022-12-05 RX ORDER — AMOXICILLIN AND CLAVULANATE POTASSIUM 875; 125 MG/1; MG/1
1 TABLET, FILM COATED ORAL EVERY 12 HOURS SCHEDULED
Status: DISCONTINUED | OUTPATIENT
Start: 2022-12-05 | End: 2022-12-07 | Stop reason: HOSPADM

## 2022-12-05 RX ADMIN — AMOXICILLIN AND CLAVULANATE POTASSIUM 1 TABLET: 875; 125 TABLET, FILM COATED ORAL at 16:40

## 2022-12-05 RX ADMIN — SODIUM CHLORIDE, PRESERVATIVE FREE 10 ML: 5 INJECTION INTRAVENOUS at 08:34

## 2022-12-05 RX ADMIN — OXYCODONE HYDROCHLORIDE 10 MG: 10 TABLET ORAL at 12:43

## 2022-12-05 RX ADMIN — ACETAMINOPHEN 650 MG: 325 TABLET ORAL at 04:26

## 2022-12-05 RX ADMIN — METHOCARBAMOL 1000 MG: 500 TABLET ORAL at 14:30

## 2022-12-05 RX ADMIN — ASPIRIN 325 MG: 325 TABLET, DELAYED RELEASE ORAL at 20:25

## 2022-12-05 RX ADMIN — ASPIRIN 325 MG: 325 TABLET, DELAYED RELEASE ORAL at 08:34

## 2022-12-05 RX ADMIN — CEFAZOLIN 2000 MG: 2 INJECTION, POWDER, FOR SOLUTION INTRAMUSCULAR; INTRAVENOUS at 04:25

## 2022-12-05 RX ADMIN — GABAPENTIN 200 MG: 100 CAPSULE ORAL at 20:25

## 2022-12-05 RX ADMIN — METHOCARBAMOL 1000 MG: 500 TABLET ORAL at 08:33

## 2022-12-05 RX ADMIN — POLYETHYLENE GLYCOL 3350 17 G: 17 POWDER, FOR SOLUTION ORAL at 11:40

## 2022-12-05 RX ADMIN — METHOCARBAMOL 1000 MG: 500 TABLET ORAL at 16:40

## 2022-12-05 RX ADMIN — SODIUM CHLORIDE, PRESERVATIVE FREE 10 ML: 5 INJECTION INTRAVENOUS at 20:26

## 2022-12-05 RX ADMIN — DOXYCYCLINE HYCLATE 100 MG: 100 CAPSULE ORAL at 16:40

## 2022-12-05 RX ADMIN — DOCUSATE SODIUM 100 MG: 100 CAPSULE, LIQUID FILLED ORAL at 20:24

## 2022-12-05 RX ADMIN — GABAPENTIN 200 MG: 100 CAPSULE ORAL at 08:34

## 2022-12-05 RX ADMIN — OXYCODONE HYDROCHLORIDE 10 MG: 10 TABLET ORAL at 04:26

## 2022-12-05 RX ADMIN — OXYCODONE HYDROCHLORIDE 10 MG: 10 TABLET ORAL at 17:11

## 2022-12-05 RX ADMIN — OXYCODONE HYDROCHLORIDE 10 MG: 10 TABLET ORAL at 21:07

## 2022-12-05 RX ADMIN — SODIUM CHLORIDE, PRESERVATIVE FREE 10 ML: 5 INJECTION INTRAVENOUS at 11:43

## 2022-12-05 RX ADMIN — METHOCARBAMOL 1000 MG: 500 TABLET ORAL at 20:24

## 2022-12-05 RX ADMIN — GABAPENTIN 200 MG: 100 CAPSULE ORAL at 14:30

## 2022-12-05 RX ADMIN — DOCUSATE SODIUM 100 MG: 100 CAPSULE, LIQUID FILLED ORAL at 11:40

## 2022-12-05 RX ADMIN — CEFAZOLIN 2000 MG: 2 INJECTION, POWDER, FOR SOLUTION INTRAMUSCULAR; INTRAVENOUS at 12:38

## 2022-12-05 ASSESSMENT — PAIN SCALES - GENERAL
PAINLEVEL_OUTOF10: 3
PAINLEVEL_OUTOF10: 3
PAINLEVEL_OUTOF10: 7
PAINLEVEL_OUTOF10: 8

## 2022-12-05 ASSESSMENT — PAIN DESCRIPTION - LOCATION
LOCATION: HIP
LOCATION: LEG
LOCATION: LEG

## 2022-12-05 ASSESSMENT — PAIN DESCRIPTION - DESCRIPTORS
DESCRIPTORS: ACHING;DISCOMFORT;SORE
DESCRIPTORS: ACHING;THROBBING
DESCRIPTORS: ACHING

## 2022-12-05 ASSESSMENT — PAIN DESCRIPTION - FREQUENCY: FREQUENCY: INTERMITTENT

## 2022-12-05 ASSESSMENT — PAIN DESCRIPTION - ORIENTATION
ORIENTATION: RIGHT

## 2022-12-05 ASSESSMENT — PAIN - FUNCTIONAL ASSESSMENT
PAIN_FUNCTIONAL_ASSESSMENT: PREVENTS OR INTERFERES SOME ACTIVE ACTIVITIES AND ADLS

## 2022-12-05 ASSESSMENT — PAIN DESCRIPTION - PAIN TYPE: TYPE: ACUTE PAIN;SURGICAL PAIN

## 2022-12-05 NOTE — PROGRESS NOTES
Department of Orthopedic Surgery  Resident Progress Note    POD 1 s/p R femur ORIF. Patient seen and examined. Pain well controlled. No new complaints. Denies chest pain, shortness of breath, dizziness/lightheadedness. Overall doing well. VITALS:  /64   Pulse 82   Temp 97.9 °F (36.6 °C) (Temporal)   Resp 16   Ht 5' 7\" (1.702 m)   Wt 191 lb (86.6 kg)   SpO2 95%   BMI 29.91 kg/m²     General: in no acute distress and alert    MUSCULOSKELETAL:   right lower extremity:  Dressing C/D/I  Compartments soft and compressible  Calf is soft and nontender  +PF/DF/EHL  Brisk Cap refill, Toes warm and perfused  Distal sensation grossly intact to Peroneals, Sural, Saphenous, and tibial nrs    CBC:   Lab Results   Component Value Date/Time    WBC 11.2 12/04/2022 05:10 AM    HGB 11.8 12/04/2022 05:10 AM    HCT 36.1 12/04/2022 05:10 AM     12/04/2022 05:10 AM     PT/INR:    Lab Results   Component Value Date/Time    PROTIME 11.2 12/03/2022 10:35 PM    INR 1.0 12/03/2022 10:35 PM       ASSESSMENT  S/P R periprosthetic femur ORIF - 12/4/22    PLAN      Continue physical therapy and protocol: TTWB - R LE  24 hour abx coverage to be completed today  Deep venous thrombosis prophylaxis - asa, early mobilization  PT/OT  Pain Control: IV and PO  Monitor H&H  D/C Plan:  pending sw/cm and therapy recommendations.  Ok to discharge from orthopedic standpoint once appropriate discharge plan is in place    Electronically signed by Carter Vogel DO on 12/5/2022 at 5:44 AM

## 2022-12-05 NOTE — CARE COORDINATION
12/5/2022 social work transition of care planning  Pt is from home with family and had been independent. Pt pcp is Dr. Alisha Lunsford and [facility] is [facility] [address] Explained sw role in transition of care planning. Pt would like to return home,if appropriate. Pt agreeable to acute,if needed. Sw left pa eusebio list with pt. The Plan for Transition of Care is related to the following treatment goals: improvement of functioning    The Patient and/or patient representative  was provided with a choice of provider and agrees   with the discharge plan. [x] Yes [] No    Freedom of choice list was provided with basic dialogue that supports the patient's individualized plan of care/goals, treatment preferences and shares the quality data associated with the providers.  [x] Yes [] No    Electronically signed by MARY Parks on 12/5/2022 at 1:43 PM

## 2022-12-05 NOTE — PROGRESS NOTES
Hospitalist Progress Note      Synopsis: Patient admitted on 12/3/2022 Ms. Marisol Wynne, a 79y.o. year old female  who  has no past medical history on file. Marisol Wynne is a 79 y.o. female presenting to the ED for history of fall down steps, beginning short time ago. The complaint has been persistent, moderate in severity, and worsened by movement of right hip. Presenting here because of fall. Reports that she tripped and fell. She was using her slippers and fell down 3 steps. patient did strike her head. Patient reporting no chest pain or difficulty breathing she does complain of hip pain. Patient reporting no abdominal pain or chest pain she reports no shortness of breath. Patient reporting no nausea or vomiting. Patient has not any blood thinners. Patient does complain of back pain. Patient reports that she is does have back pain normally. Imaging shows a periprosthetic fracture of the right femur. Fracture fragments are displaced approximately 11 mm distally. Fracture extends near to the cephalad extent of the residual bone. Orthopedic service was consulted. Subjective  Seen and examined chart reviewed. Patient complains of lower extremity pain and constipation. She has no chest pain or shortness of breath but she still requiring 3 dysfunction via nasal cannula  Exam:  /64   Pulse 82   Temp 97.9 °F (36.6 °C) (Temporal)   Resp 16   Ht 5' 7\" (1.702 m)   Wt 191 lb (86.6 kg)   SpO2 95%   BMI 29.91 kg/m²   -General:  Awake, alert, oriented X 3. Well developed, well nourished, well groomed. No apparent distress. -HEENT:  Normocephalic, atraumatic. Pupils equal, round, reactive to light. No scleral icterus. No conjunctival injection. Normal lips, teeth, and gums. No nasal discharge. Neck:  Supple    -Heart:  RRR, no murmurs, gallops, rubs    -Lungs:  CTA bilaterally, bilat symmetrical expansion, no wheeze, rales, or rhonchi    -Abdomen:   Bowel sounds present, soft, nontender, no masses, no organomegaly, no peritoneal signs    -Extremities: normal ROM. No Cyanosis clubbing or edema    -Skin: Warm and dry    -Neuro:  AAO x 3 . Cranial nerves 2-12 intact, no focal deficits     -Psych : normal .    Medications:  Reviewed    Infusion Medications    sodium chloride      sodium chloride 75 mL/hr at 12/04/22 1530    sodium chloride       Scheduled Medications    docusate sodium  100 mg Oral BID    polyethylene glycol  17 g Oral Once    sodium chloride flush  10 mL IntraVENous 2 times per day    sodium chloride flush  5-40 mL IntraVENous 2 times per day    ceFAZolin  2,000 mg IntraVENous Q8H    aspirin  325 mg Oral BID    methocarbamol  1,000 mg Oral 4x Daily    gabapentin  200 mg Oral TID     PRN Meds: polyethylene glycol, morphine, sodium chloride flush, sodium chloride, promethazine **OR** ondansetron, acetaminophen **OR** acetaminophen, sodium chloride flush, sodium chloride, HYDROmorphone, HYDROmorphone, ondansetron, oxyCODONE **OR** oxyCODONE    I/O    Intake/Output Summary (Last 24 hours) at 12/5/2022 0944  Last data filed at 12/4/2022 1128  Gross per 24 hour   Intake 1000 ml   Output --   Net 1000 ml         Labs:   Recent Labs     12/03/22 2042 12/04/22  0510 12/05/22  0705   WBC 14.4* 11.2 11.7*   HGB 12.7 11.8 9.1*   HCT 38.0 36.1 28.3*    246 217         Recent Labs     12/03/22 2042 12/04/22  0510 12/05/22  0705    139 141   K 3.8 4.1 4.2    106 110*   CO2 20* 21* 23   BUN 21 17 15   CREATININE 0.8 0.7 0.7   CALCIUM 9.0 8.7 8.5*         Recent Labs     12/03/22 2042 12/05/22  0705   PROT 7.1 5.4*   ALKPHOS 102 70   ALT 14 11   AST 21 24   BILITOT 0.2 0.2         Recent Labs     12/03/22  2235   INR 1.0         No results for input(s): CKTOTAL, TROPONINI in the last 72 hours. Chronic labs:  Lab Results   Component Value Date    INR 1.0 12/03/2022       Radiology:  Imaging studies reviewed today.     ASSESSMENT:    Principal Problem: Mary-prosthetic fracture around prosthetic hip, initial encounter  Resolved Problems:    * No resolved hospital problems. *       Assessment/PLAN:    --Closed, Right Periprosthetic hip Fracture status post fall at home. S/P R periprosthetic femur ORIF - 12/4/22  Pain control, orthopedic surgery following     --Hypoxia, patient currently on 3 L oxygen nasal cannula. Encourage intensive spirometer we will plan to repeat EKG and check cultures and chest x-ray    --Sinus tachycardia likely due to pain. Now resolved EKG essentially unremarkable, now resolved    --Hypertension likely worsened by pain due to above, close monitoring just as needed      Diet: ADULT DIET; Regular  Code Status: Full Code  PT/OT Eval Status:     DVT Prophylaxis:     Recommended disposition at discharge:      +++++++++++++++++++++++++++++++++++++++++++++++++  Chema Coello MD  12/5/2022  [facility].  +++++++++++++++++++++++++++++++++++++++++++++++++  NOTE: This report was transcribed using voice recognition software. Every effort was made to ensure accuracy; however, inadvertent computerized transcription errors may be present.

## 2022-12-06 LAB
ALBUMIN SERPL-MCNC: 3.2 G/DL (ref 3.5–5.2)
ALP BLD-CCNC: 73 U/L (ref 35–104)
ALT SERPL-CCNC: 8 U/L (ref 0–32)
ANION GAP SERPL CALCULATED.3IONS-SCNC: 11 MMOL/L (ref 7–16)
AST SERPL-CCNC: 26 U/L (ref 0–31)
BASOPHILS ABSOLUTE: 0.04 E9/L (ref 0–0.2)
BASOPHILS RELATIVE PERCENT: 0.4 % (ref 0–2)
BILIRUB SERPL-MCNC: 0.3 MG/DL (ref 0–1.2)
BUN BLDV-MCNC: 15 MG/DL (ref 6–23)
CALCIUM SERPL-MCNC: 8.4 MG/DL (ref 8.6–10.2)
CHLORIDE BLD-SCNC: 107 MMOL/L (ref 98–107)
CO2: 24 MMOL/L (ref 22–29)
CREAT SERPL-MCNC: 0.8 MG/DL (ref 0.5–1)
EOSINOPHILS ABSOLUTE: 0.2 E9/L (ref 0.05–0.5)
EOSINOPHILS RELATIVE PERCENT: 1.8 % (ref 0–6)
GFR SERPL CREATININE-BSD FRML MDRD: >60 ML/MIN/1.73
GLUCOSE BLD-MCNC: 97 MG/DL (ref 74–99)
HCT VFR BLD CALC: 26.5 % (ref 34–48)
HCT VFR BLD CALC: 27.7 % (ref 34–48)
HEMOGLOBIN: 8.6 G/DL (ref 11.5–15.5)
HEMOGLOBIN: 8.8 G/DL (ref 11.5–15.5)
IMMATURE GRANULOCYTES #: 0.07 E9/L
IMMATURE GRANULOCYTES %: 0.6 % (ref 0–5)
LYMPHOCYTES ABSOLUTE: 2.52 E9/L (ref 1.5–4)
LYMPHOCYTES RELATIVE PERCENT: 22.2 % (ref 20–42)
MAGNESIUM: 1.8 MG/DL (ref 1.6–2.6)
MCH RBC QN AUTO: 32.7 PG (ref 26–35)
MCHC RBC AUTO-ENTMCNC: 32.5 % (ref 32–34.5)
MCV RBC AUTO: 100.8 FL (ref 80–99.9)
MONOCYTES ABSOLUTE: 1.02 E9/L (ref 0.1–0.95)
MONOCYTES RELATIVE PERCENT: 9 % (ref 2–12)
MRSA CULTURE ONLY: NORMAL
NEUTROPHILS ABSOLUTE: 7.5 E9/L (ref 1.8–7.3)
NEUTROPHILS RELATIVE PERCENT: 66 % (ref 43–80)
PDW BLD-RTO: 15.2 FL (ref 11.5–15)
PLATELET # BLD: 224 E9/L (ref 130–450)
PMV BLD AUTO: 10.9 FL (ref 7–12)
POTASSIUM SERPL-SCNC: 4.1 MMOL/L (ref 3.5–5)
RBC # BLD: 2.63 E12/L (ref 3.5–5.5)
REASON FOR REJECTION: NORMAL
REJECTED TEST: NORMAL
SODIUM BLD-SCNC: 142 MMOL/L (ref 132–146)
TOTAL PROTEIN: 5.9 G/DL (ref 6.4–8.3)
WBC # BLD: 11.4 E9/L (ref 4.5–11.5)

## 2022-12-06 PROCEDURE — 85018 HEMOGLOBIN: CPT

## 2022-12-06 PROCEDURE — 1200000000 HC SEMI PRIVATE

## 2022-12-06 PROCEDURE — 2580000003 HC RX 258: Performed by: PHYSICAL THERAPY ASSISTANT

## 2022-12-06 PROCEDURE — 83735 ASSAY OF MAGNESIUM: CPT

## 2022-12-06 PROCEDURE — 36415 COLL VENOUS BLD VENIPUNCTURE: CPT

## 2022-12-06 PROCEDURE — 6370000000 HC RX 637 (ALT 250 FOR IP): Performed by: FAMILY MEDICINE

## 2022-12-06 PROCEDURE — 97161 PT EVAL LOW COMPLEX 20 MIN: CPT

## 2022-12-06 PROCEDURE — 97165 OT EVAL LOW COMPLEX 30 MIN: CPT

## 2022-12-06 PROCEDURE — 85014 HEMATOCRIT: CPT

## 2022-12-06 PROCEDURE — 85025 COMPLETE CBC W/AUTO DIFF WBC: CPT

## 2022-12-06 PROCEDURE — 97530 THERAPEUTIC ACTIVITIES: CPT

## 2022-12-06 PROCEDURE — 80053 COMPREHEN METABOLIC PANEL: CPT

## 2022-12-06 PROCEDURE — 2700000000 HC OXYGEN THERAPY PER DAY

## 2022-12-06 PROCEDURE — 6370000000 HC RX 637 (ALT 250 FOR IP): Performed by: PHYSICAL THERAPY ASSISTANT

## 2022-12-06 PROCEDURE — 97535 SELF CARE MNGMENT TRAINING: CPT

## 2022-12-06 RX ORDER — GUAIFENESIN/DEXTROMETHORPHAN 100-10MG/5
5 SYRUP ORAL EVERY 4 HOURS PRN
Status: DISCONTINUED | OUTPATIENT
Start: 2022-12-06 | End: 2022-12-07 | Stop reason: HOSPADM

## 2022-12-06 RX ORDER — PANTOPRAZOLE SODIUM 40 MG/1
40 TABLET, DELAYED RELEASE ORAL
Status: DISCONTINUED | OUTPATIENT
Start: 2022-12-06 | End: 2022-12-07 | Stop reason: HOSPADM

## 2022-12-06 RX ADMIN — METHOCARBAMOL 1000 MG: 500 TABLET ORAL at 17:31

## 2022-12-06 RX ADMIN — ASPIRIN 325 MG: 325 TABLET, DELAYED RELEASE ORAL at 08:34

## 2022-12-06 RX ADMIN — GABAPENTIN 200 MG: 100 CAPSULE ORAL at 10:52

## 2022-12-06 RX ADMIN — GABAPENTIN 200 MG: 100 CAPSULE ORAL at 20:38

## 2022-12-06 RX ADMIN — AMOXICILLIN AND CLAVULANATE POTASSIUM 1 TABLET: 875; 125 TABLET, FILM COATED ORAL at 08:34

## 2022-12-06 RX ADMIN — GUAIFENESIN SYRUP AND DEXTROMETHORPHAN 5 ML: 100; 10 SYRUP ORAL at 08:34

## 2022-12-06 RX ADMIN — ASPIRIN 325 MG: 325 TABLET, DELAYED RELEASE ORAL at 20:39

## 2022-12-06 RX ADMIN — DOCUSATE SODIUM 100 MG: 100 CAPSULE, LIQUID FILLED ORAL at 08:34

## 2022-12-06 RX ADMIN — DOCUSATE SODIUM 100 MG: 100 CAPSULE, LIQUID FILLED ORAL at 20:38

## 2022-12-06 RX ADMIN — METHOCARBAMOL 1000 MG: 500 TABLET ORAL at 20:39

## 2022-12-06 RX ADMIN — DOXYCYCLINE HYCLATE 100 MG: 100 CAPSULE ORAL at 08:34

## 2022-12-06 RX ADMIN — SODIUM CHLORIDE, PRESERVATIVE FREE 10 ML: 5 INJECTION INTRAVENOUS at 08:32

## 2022-12-06 RX ADMIN — METHOCARBAMOL 1000 MG: 500 TABLET ORAL at 08:34

## 2022-12-06 RX ADMIN — DOXYCYCLINE HYCLATE 100 MG: 100 CAPSULE ORAL at 20:39

## 2022-12-06 RX ADMIN — OXYCODONE HYDROCHLORIDE 10 MG: 10 TABLET ORAL at 05:29

## 2022-12-06 RX ADMIN — GUAIFENESIN SYRUP AND DEXTROMETHORPHAN 5 ML: 100; 10 SYRUP ORAL at 02:24

## 2022-12-06 RX ADMIN — OXYCODONE HYDROCHLORIDE 10 MG: 10 TABLET ORAL at 17:31

## 2022-12-06 RX ADMIN — AMOXICILLIN AND CLAVULANATE POTASSIUM 1 TABLET: 875; 125 TABLET, FILM COATED ORAL at 20:39

## 2022-12-06 RX ADMIN — METHOCARBAMOL 1000 MG: 500 TABLET ORAL at 14:29

## 2022-12-06 RX ADMIN — GABAPENTIN 200 MG: 100 CAPSULE ORAL at 14:29

## 2022-12-06 RX ADMIN — PANTOPRAZOLE SODIUM 40 MG: 40 TABLET, DELAYED RELEASE ORAL at 08:33

## 2022-12-06 RX ADMIN — OXYCODONE HYDROCHLORIDE 10 MG: 10 TABLET ORAL at 01:59

## 2022-12-06 ASSESSMENT — PAIN DESCRIPTION - ORIENTATION
ORIENTATION: RIGHT

## 2022-12-06 ASSESSMENT — PAIN DESCRIPTION - ONSET: ONSET: ON-GOING

## 2022-12-06 ASSESSMENT — PAIN DESCRIPTION - PAIN TYPE: TYPE: SURGICAL PAIN

## 2022-12-06 ASSESSMENT — PAIN SCALES - GENERAL
PAINLEVEL_OUTOF10: 3
PAINLEVEL_OUTOF10: 8
PAINLEVEL_OUTOF10: 6
PAINLEVEL_OUTOF10: 3
PAINLEVEL_OUTOF10: 2
PAINLEVEL_OUTOF10: 6

## 2022-12-06 ASSESSMENT — PAIN DESCRIPTION - DESCRIPTORS
DESCRIPTORS: ACHING

## 2022-12-06 ASSESSMENT — PAIN DESCRIPTION - LOCATION
LOCATION: LEG

## 2022-12-06 ASSESSMENT — PAIN - FUNCTIONAL ASSESSMENT: PAIN_FUNCTIONAL_ASSESSMENT: PREVENTS OR INTERFERES SOME ACTIVE ACTIVITIES AND ADLS

## 2022-12-06 ASSESSMENT — PAIN DESCRIPTION - FREQUENCY: FREQUENCY: CONTINUOUS

## 2022-12-06 NOTE — CARE COORDINATION
Spoke with patient at the bedside. Patient does not want to come to ARU. Patient would like set up with home health.

## 2022-12-06 NOTE — PROGRESS NOTES
[address]                                                Patient Name: Natalya Dillard  MRN: [id number]  : 1955  Room: [address] [place] [address]     Evaluating OT:Kim Srivastava OTR/L   License #  [id number]       Referring Provider: Yeison Zuniga DO    Specific Provider Orders/Date: OT evaluation & treatment        Diagnosis: Fall. Mary-prosthetic fracture around prosthetic hip, Closed fracture of proximal end of femur     Pertinent Medical History:  has no past medical history on file. Surgery: 22: ORIF RIGHT PERIPROSTHETIC HIP FRACTURE    Past Surgical History:  has a past surgical history that includes joint replacement; Cholecystectomy; and Femur fracture surgery (Right, 2022). Precautions:  Fall Risk, TTWB R LE      Assessment of current deficits    [x] Functional mobility            [x]ADLs           [x] Strength                  [x]Cognition    [x] Functional transfers          [x] IADLs         [x] Safety Awareness   [x]Endurance    [x] Fine Coordination                         [x] Balance      [] Vision/perception   [x]Sensation      []Gross Motor Coordination             [] ROM           [] Delirium                   [] Motor Control      OT PLAN OF CARE   OT POC based on physician orders, patient diagnosis and results of clinical assessment     Frequency/Duration: 2-4 days/wk for 2 weeks PRN   Specific OT Treatment Interventions to include:    Instruction/training on adapted ADL techniques and AE recommendations to increase functional independence within precautions  Training on energy conservation strategies, correct breathing pattern and techniques to improve independence/tolerance for self-care routine  Functional transfer/mobility training/DME recommendations for increased independence, safety, and fall prevention  Patient/Family education to increase follow through with safety techniques and functional independence  Recommendation of environmental modifications for increased safety with functional transfers/mobility and ADLs  Therapeutic exercise to improve motor endurance, ROM, and functional strength for ADLs/functional transfers  Therapeutic activities to facilitate/challenge dynamic balance, stand tolerance for increased safety and independence with ADLs  Therapeutic activities to facilitate gross/fine motor skills for increased independence with ADLs  Positioning to improve skin integrity, interaction with environment and functional independence     Recommended Adaptive Equipment: ww per PT     Home Living: Pt lives alone but states son & nice able to stay with her upon d/c, in a 2 story with 3 steps to enter with 2 HR. B&B on 1st level. Bathroom setup: walk in shower   Equipment owned: toilet riser, [email or web address].     Prior Level of Function: Ind. with ADLs , Ind. with IADLs; ambulated no A.D. Driving: active  Occupation: bank teller     Pain Level: 6/10; R hip  Cognition: A&O: 4/4; Follows multi- step directions              Memory:  G              Sequencing:  G              Problem solving:  G              Judgement/safety:  F+                Functional Assessment:  AM-PAC Daily Activity Raw Score: 16/24    Initial Eval Status  Date: 12-6-22 Treatment Status  Date: STGs = LTGs  Time frame: 10-14 days   Feeding Ind.     Mod I/ Ind   Grooming Set up   Modified [place]    UB Dressing Set up to change gown   Modified [place]    LB Dressing Max A to adalid brief, B socks   Modified [place]    Bathing Mod A with bathing cloths   Modified [place]    Toileting Max A, pure wick   Modified [place]    Bed Mobility  Supine to sit: Min A  Sit to supine: NT   Supine to sit: Modified [place]   Sit to supine: Modified [place]    Functional Transfers Min A with sit <> stand, SPT using ww   Modified [place] simplification to improve endurance   Proper Positioning/Alignment: for optimal healing, skin integrity to prevent breakdown, decrease edema  Skilled monitoring of vitals: to include BP, spO2 & HR during session  Sitting/standing Balance/Tolerance- to increased balance & activity tolerance during ADLs as well as facilitate proper posture and/or positioning. Therapeutic exercise- Instruction on B UE ROM exercises to improve strength/function for increased [place] with ADLs & iADLs     Rehab Potential: Good for established goals     Patient / Family Goal: to return home, have HHOT/HHPT       Patient and/or family were instructed on functional diagnosis, prognosis/goals and OT plan of care. Demonstrated G understanding. Eval Complexity: Low     Time In: 10:02  Time Out: 10:40  Total Treatment Time: 23    Min Units   OT Eval Low 68709  x     OT Eval Medium 47336       OT Eval High 81747       OT Re-Eval [id number]       Therapeutic Ex 82694       Therapeutic Activities 26357  13  1   ADL/Self Care 09677  10  1   Orthotic Management 99548       Manual [id number]       Neuro Re-Ed 47046       Non-Billable Time          Evaluation Time additionally includes thorough review of current medical information, gathering information on past medical history/social history and prior level of function, interpretation of standardized testing/informal observation of tasks, assessment of data and development of plan of care and goals. Kim Srivastava, OTR/L   License #  [id number]

## 2022-12-06 NOTE — PROGRESS NOTES
Hospitalist Progress Note      Synopsis: Patient admitted on 12/3/2022 Ms. Emma Eduardo, a 79y.o. year old female  who  has no past medical history on file. Emma Eduardo is a 79 y.o. female presenting to the ED for history of fall down steps, beginning short time ago. The complaint has been persistent, moderate in severity, and worsened by movement of right hip. Presenting here because of fall. Reports that she tripped and fell. She was using her slippers and fell down 3 steps. patient did strike her head. Patient reporting no chest pain or difficulty breathing she does complain of hip pain. Patient reporting no abdominal pain or chest pain she reports no shortness of breath. Patient reporting no nausea or vomiting. Patient has not any blood thinners. Patient does complain of back pain. Patient reports that she is does have back pain normally. Imaging shows a periprosthetic fracture of the right femur. Fracture fragments are displaced approximately 11 mm distally. Fracture extends near to the cephalad extent of the residual bone. Orthopedic service was consulted. Subjective  Patient seen and examined chart reviewed discussed with nursing staff  No overnight events reported  Patient complains of congestion and cough  Lower extremity pain  No chest pain  Exam:  BP (!) 114/48   Pulse 94   Temp 97.6 °F (36.4 °C) (Temporal)   Resp 18   Ht 5' 7\" (1.702 m)   Wt 191 lb (86.6 kg)   SpO2 99%   BMI 29.91 kg/m²   -General:  Awake, alert, oriented X 3. Well developed, well nourished, well groomed. No apparent distress. -HEENT:  Normocephalic, atraumatic. Pupils equal, round, reactive to light. No scleral icterus. No conjunctival injection. Normal lips, teeth, and gums. No nasal discharge. Neck:  Supple    -Heart:  RRR, no murmurs, gallops, rubs    -Lungs:  CTA bilaterally, bilat symmetrical expansion, no wheeze, rales, or rhonchi    -Abdomen:   Bowel sounds present, soft, nontender, no masses, no organomegaly, no peritoneal signs    -Extremities: normal ROM. No Cyanosis clubbing or edema    -Skin: Warm and dry    -Neuro:  AAO x 3 . Cranial nerves 2-12 intact, no focal deficits     -Psych : normal .    Medications:  Reviewed    Infusion Medications    sodium chloride      sodium chloride 75 mL/hr at 12/04/22 1530     Scheduled Medications    pantoprazole  40 mg Oral QAM AC    docusate sodium  100 mg Oral BID    polyethylene glycol  17 g Oral Once    amoxicillin-clavulanate  1 tablet Oral 2 times per day    doxycycline hyclate  100 mg Oral 2 times per day    sodium chloride flush  10 mL IntraVENous 2 times per day    aspirin  325 mg Oral BID    methocarbamol  1,000 mg Oral 4x Daily    gabapentin  200 mg Oral TID     PRN Meds: guaiFENesin-dextromethorphan, polyethylene glycol, morphine, sodium chloride flush, sodium chloride, promethazine **OR** ondansetron, acetaminophen **OR** acetaminophen, oxyCODONE **OR** oxyCODONE    I/O    Intake/Output Summary (Last 24 hours) at 12/6/2022 1016  Last data filed at 12/6/2022 0541  Gross per 24 hour   Intake 240 ml   Output 500 ml   Net -260 ml         Labs:   Recent Labs     12/04/22  0510 12/05/22  0705 12/06/22  0551   WBC 11.2 11.7* 11.4   HGB 11.8 9.1* 8.6*   HCT 36.1 28.3* 26.5*    217 224         Recent Labs     12/04/22  0510 12/05/22  0705 12/06/22  0551    141 142   K 4.1 4.2 4.1    110* 107   CO2 21* 23 24   BUN 17 15 15   CREATININE 0.7 0.7 0.8   CALCIUM 8.7 8.5* 8.4*         Recent Labs     12/03/22  2042 12/05/22  0705 12/06/22  0551   PROT 7.1 5.4* 5.9*   ALKPHOS 102 70 73   ALT 14 11 8   AST 21 24 26   BILITOT 0.2 0.2 0.3         Recent Labs     12/03/22  2235   INR 1.0         No results for input(s): CKTOTAL, TROPONINI in the last 72 hours. Chronic labs:  Lab Results   Component Value Date    INR 1.0 12/03/2022       Radiology:  Imaging studies reviewed today.     ASSESSMENT:    Principal Problem: Mary-prosthetic fracture around prosthetic hip, initial encounter  Resolved Problems:    * No resolved hospital problems. *       Assessment/PLAN:    --Closed, Right Periprosthetic hip Fracture status post fall at home. S/P R periprosthetic femur ORIF - 12/4/22  Pain control, orthopedic surgery following   Patient declined rehab she would like to go home with outpatient therapy  Likely DC tomorrow    --Hypoxia, patient currently on 3 L oxygen nasal cannula. Encourage intensive spirometer we will plan to repeat EKG and check cultures and chest x-ray. Infiltrate/atelectasis. Oral antibiotics and incentive spirometer wean off O2    --Sinus tachycardia likely due to pain. Now resolved EKG essentially unremarkable, now resolved    --Hypertension likely worsened by pain due to above, close monitoring just as needed    Diet: ADULT DIET; Regular  Code Status: Full Code  PT/OT Eval Status:     DVT Prophylaxis:     Recommended disposition at discharge:      +++++++++++++++++++++++++++++++++++++++++++++++++  Nidia Rangel MD  12/6/2022  [facility].  +++++++++++++++++++++++++++++++++++++++++++++++++  NOTE: This report was transcribed using voice recognition software. Every effort was made to ensure accuracy; however, inadvertent computerized transcription errors may be present.

## 2022-12-06 NOTE — CARE COORDINATION
12/6/2022 social work transition of care planning  Pt plan is to return home with [facility]. Pt has no preference for providers. Sw made referral to [facility] [address] f [phone] and [facility]. Pt will call for a ride at discharge.   Electronically signed by MARY Triana on 12/6/2022 at 11:26 AM

## 2022-12-06 NOTE — PROGRESS NOTES
Strength/ROM:     RLE grossly 3+/5  LLE grossly 4+/5  RLE AROM WFL  LLE AROM WFL    Balance:   Static Sitting: Ind  Dynamic Sitting: Ind  Static Standing: Min with fww  Dynamic Standing: Min with fww      Patient is Alert & Oriented x person, place, time, and situation and follows directions   Sensation:  Pt denies numbness and tingling to extremities  Edema:  RLE  Therapeutic Exercises:  Functional activity as stated above cues for sequencing. Patient education  Pt educated on role of Physical Therapy, risks of immobility, safety and plan of care,  importance of mobility while in hospital , and weight bearing status  and use of call light for safety. Patient response to education:   Pt verbalized understanding Pt demonstrated skill Pt requires further education in this area   yes yes Reminders     ASSESSMENT:    Conditions Requiring Skilled Therapeutic Intervention:    [x]Decreased strength     [x]Decreased ROM  [x]Decreased functional mobility  [x]Decreased balance   [x]Decreased endurance   [x]Decreased posture  []Decreased sensation  []Decreased coordination   []Decreased vision  [x]Decreased safety awareness   []Increased pain       Comments:    RN cleared patient for participation in therapy session. Patient was seen this date for PT evaluation. . Patient was agreeable to intervention. Results of the functional assessment are noted above. Upon entering the room patient was found supine in bed. Assisted to EOB. Sat EOB x 10 minutes to increase dynamic sitting balance and activity tolerance. Transfers and gait completed with fww. Cues to maintain TTWB RKLE which she was able to do. At end of session, patient in chair with RLE elevated call light and phone within reach,  all lines and tubes intact, nursing notified. This patient can benefit from the continuation of skilled PT  to maximize functional level and return to PLOF.      Assistance of two required due to acuity of medical condition, complex medical lines management as well as overall deconditioning of patient. Treatment:  Patient completed and was instructed in the following treatment:      Bed mobility training - pt given verbal and tactile cues to facilitate proper sequencing and safety during rolling and supine>sit as well as provided with physical assistance to complete task    Assistive device training - pt educated on using [employer] during gait, [employer] approximation/negotiation, and hand placement during sit<>stand to [employer]  STS and transfer training - educated on hand/foot placement, safety, and sequencing during STS and pivot transfers using assistive device  Gait training - verbal cues for  assistive device approximation/negotiation, upright posture, and safety during 90 and 180 degree turns during gait   Education in TTWB RLE, use of fww and need to refrain from smoking. Use of call light for safety  Skillful positioning in bed to protect skin/joint integrity. Vitals and symptoms were closely monitored throughout session. Pt's/ family goals      Improve strength and Return Home    Prognosis is good  for reaching above PT goals. Patient and or family understand(s) diagnosis, prognosis, and plan of care.  yes,     PHYSICAL THERAPY PLAN OF CARE:    PT POC is established based on physician order and patient diagnosis     Diagnosis:  Lumbar back pain [M54.50]  Injury of head, initial encounter [S09.90XA]  Laceration of scalp, initial encounter [S01.01XA]  Mary-prosthetic fracture around prosthetic hip, initial encounter [N21. 8XXA, Z96.649]  Closed fracture of proximal end of femur ([facility] [address] [S72.009A]  Specific instructions for next treatment:  Transfer to bedside chair, Increase ambulation distance, and Stair negotiation  Current Treatment Recommendations:     [x] Strengthening to improve independence with functional mobility   [x] ROM to improve independence with functional mobility   [x] Balance Training to improve static/dynamic balance and to reduce fall risk  [x] Endurance Training to improve activity tolerance during functional mobility   [x] Transfer Training to improve safety and independence with all functional transfers   [x] Gait Training to improve gait mechanics, endurance and asses need for appropriate assistive device  [x] Stair Training in preparation for safe discharge home and/or into the community when appropriate  [] Positioning to prevent skin breakdown and contractures  [x] Safety and Education Training   [] Patient/Caregiver Education   [] HEP  [] Gait Team to be added to POC  [] Other     PT long term treatment goals are located in above grid    Frequency of treatments: 2-5x/week x 1-2 weeks. Time in  1005  Time out  1045    Total Treatment Time  25 minutes     Evaluation Time includes thorough review of current medical information, gathering information on past medical history/social history and prior level of function, completion of standardized testing/informal observation of tasks, assessment of data and education on plan of care and goals.     CPT codes:  [x] Low Complexity PT evaluation 22261  [] Moderate Complexity PT evaluation 99627  [] High Complexity PT evaluation 27203  [] PT Re-evaluation 89776  [] Gait training 41497 - minutes  [] Manual therapy 50231  minutes  [x] Therapeutic activities 20211 -25 minutes  [] Therapeutic exercises 98205 - minutes  [] Neuromuscular reeducation [address] [place] minutes     [facility], [id number] [facility]

## 2022-12-07 VITALS
HEIGHT: 67 IN | OXYGEN SATURATION: 94 % | DIASTOLIC BLOOD PRESSURE: 77 MMHG | RESPIRATION RATE: 16 BRPM | SYSTOLIC BLOOD PRESSURE: 180 MMHG | HEART RATE: 111 BPM | TEMPERATURE: 98.4 F | WEIGHT: 191 LBS | BODY MASS INDEX: 29.98 KG/M2

## 2022-12-07 LAB
ALBUMIN SERPL-MCNC: 3.1 G/DL (ref 3.5–5.2)
ALP BLD-CCNC: 77 U/L (ref 35–104)
ALT SERPL-CCNC: 9 U/L (ref 0–32)
ANION GAP SERPL CALCULATED.3IONS-SCNC: 9 MMOL/L (ref 7–16)
AST SERPL-CCNC: 25 U/L (ref 0–31)
BASOPHILS ABSOLUTE: 0.1 E9/L (ref 0–0.2)
BASOPHILS RELATIVE PERCENT: 0.9 % (ref 0–2)
BILIRUB SERPL-MCNC: 0.4 MG/DL (ref 0–1.2)
BUN BLDV-MCNC: 12 MG/DL (ref 6–23)
CALCIUM SERPL-MCNC: 8.9 MG/DL (ref 8.6–10.2)
CHLORIDE BLD-SCNC: 108 MMOL/L (ref 98–107)
CO2: 24 MMOL/L (ref 22–29)
CREAT SERPL-MCNC: 0.6 MG/DL (ref 0.5–1)
EOSINOPHILS ABSOLUTE: 0.57 E9/L (ref 0.05–0.5)
EOSINOPHILS RELATIVE PERCENT: 5.3 % (ref 0–6)
GFR SERPL CREATININE-BSD FRML MDRD: >60 ML/MIN/1.73
GLUCOSE BLD-MCNC: 98 MG/DL (ref 74–99)
HCT VFR BLD CALC: 26.8 % (ref 34–48)
HEMOGLOBIN: 8.7 G/DL (ref 11.5–15.5)
IMMATURE GRANULOCYTES #: 0.12 E9/L
IMMATURE GRANULOCYTES %: 1.1 % (ref 0–5)
LYMPHOCYTES ABSOLUTE: 1.77 E9/L (ref 1.5–4)
LYMPHOCYTES RELATIVE PERCENT: 16.4 % (ref 20–42)
MAGNESIUM: 1.9 MG/DL (ref 1.6–2.6)
MCH RBC QN AUTO: 32.6 PG (ref 26–35)
MCHC RBC AUTO-ENTMCNC: 32.5 % (ref 32–34.5)
MCV RBC AUTO: 100.4 FL (ref 80–99.9)
MONOCYTES ABSOLUTE: 0.69 E9/L (ref 0.1–0.95)
MONOCYTES RELATIVE PERCENT: 6.4 % (ref 2–12)
NEUTROPHILS ABSOLUTE: 7.52 E9/L (ref 1.8–7.3)
NEUTROPHILS RELATIVE PERCENT: 69.9 % (ref 43–80)
PDW BLD-RTO: 15.1 FL (ref 11.5–15)
PLATELET # BLD: 250 E9/L (ref 130–450)
PMV BLD AUTO: 10.6 FL (ref 7–12)
POTASSIUM SERPL-SCNC: 3.9 MMOL/L (ref 3.5–5)
RBC # BLD: 2.67 E12/L (ref 3.5–5.5)
SODIUM BLD-SCNC: 141 MMOL/L (ref 132–146)
TOTAL PROTEIN: 6 G/DL (ref 6.4–8.3)
WBC # BLD: 10.8 E9/L (ref 4.5–11.5)

## 2022-12-07 PROCEDURE — 6370000000 HC RX 637 (ALT 250 FOR IP): Performed by: FAMILY MEDICINE

## 2022-12-07 PROCEDURE — 97530 THERAPEUTIC ACTIVITIES: CPT

## 2022-12-07 PROCEDURE — 80053 COMPREHEN METABOLIC PANEL: CPT

## 2022-12-07 PROCEDURE — 6370000000 HC RX 637 (ALT 250 FOR IP): Performed by: PHYSICAL THERAPY ASSISTANT

## 2022-12-07 PROCEDURE — 85025 COMPLETE CBC W/AUTO DIFF WBC: CPT

## 2022-12-07 PROCEDURE — 83735 ASSAY OF MAGNESIUM: CPT

## 2022-12-07 PROCEDURE — 36415 COLL VENOUS BLD VENIPUNCTURE: CPT

## 2022-12-07 PROCEDURE — 2580000003 HC RX 258: Performed by: PHYSICAL THERAPY ASSISTANT

## 2022-12-07 PROCEDURE — 97535 SELF CARE MNGMENT TRAINING: CPT

## 2022-12-07 RX ORDER — DOXYCYCLINE HYCLATE 100 MG/1
100 CAPSULE ORAL EVERY 12 HOURS SCHEDULED
Qty: 10 CAPSULE | Refills: 0 | Status: SHIPPED | OUTPATIENT
Start: 2022-12-07 | End: 2022-12-12

## 2022-12-07 RX ORDER — METHOCARBAMOL 1000 MG/1
1000 TABLET, COATED ORAL 4 TIMES DAILY
Qty: 40 TABLET | Refills: 0 | Status: SHIPPED | OUTPATIENT
Start: 2022-12-07 | End: 2022-12-17

## 2022-12-07 RX ORDER — PSEUDOEPHEDRINE HCL 30 MG
100 TABLET ORAL 2 TIMES DAILY
Refills: 0 | COMMUNITY
Start: 2022-12-07

## 2022-12-07 RX ORDER — GABAPENTIN 100 MG/1
200 CAPSULE ORAL 3 TIMES DAILY
Qty: 18 CAPSULE | Refills: 0 | Status: SHIPPED | OUTPATIENT
Start: 2022-12-07 | End: 2022-12-10

## 2022-12-07 RX ORDER — PANTOPRAZOLE SODIUM 40 MG/1
40 TABLET, DELAYED RELEASE ORAL
Qty: 30 TABLET | Refills: 0 | Status: SHIPPED | OUTPATIENT
Start: 2022-12-08

## 2022-12-07 RX ORDER — POLYETHYLENE GLYCOL 3350 17 G/17G
17 POWDER, FOR SOLUTION ORAL 2 TIMES DAILY PRN
Qty: 527 G | Refills: 0 | COMMUNITY
Start: 2022-12-07 | End: 2023-01-06

## 2022-12-07 RX ORDER — AMOXICILLIN AND CLAVULANATE POTASSIUM 875; 125 MG/1; MG/1
1 TABLET, FILM COATED ORAL EVERY 12 HOURS SCHEDULED
Qty: 10 TABLET | Refills: 0 | Status: SHIPPED | OUTPATIENT
Start: 2022-12-07 | End: 2022-12-12

## 2022-12-07 RX ORDER — GUAIFENESIN/DEXTROMETHORPHAN 100-10MG/5
5 SYRUP ORAL EVERY 4 HOURS PRN
Qty: 120 ML | Refills: 0 | COMMUNITY
Start: 2022-12-07 | End: 2022-12-17

## 2022-12-07 RX ADMIN — OXYCODONE HYDROCHLORIDE 10 MG: 10 TABLET ORAL at 07:44

## 2022-12-07 RX ADMIN — METHOCARBAMOL 1000 MG: 500 TABLET ORAL at 12:54

## 2022-12-07 RX ADMIN — ASPIRIN 325 MG: 325 TABLET, DELAYED RELEASE ORAL at 07:47

## 2022-12-07 RX ADMIN — DOXYCYCLINE HYCLATE 100 MG: 100 CAPSULE ORAL at 07:45

## 2022-12-07 RX ADMIN — DOCUSATE SODIUM 100 MG: 100 CAPSULE, LIQUID FILLED ORAL at 07:45

## 2022-12-07 RX ADMIN — GABAPENTIN 200 MG: 100 CAPSULE ORAL at 07:43

## 2022-12-07 RX ADMIN — METOPROLOL TARTRATE 12.5 MG: 25 TABLET, FILM COATED ORAL at 10:20

## 2022-12-07 RX ADMIN — SODIUM CHLORIDE, PRESERVATIVE FREE 10 ML: 5 INJECTION INTRAVENOUS at 07:45

## 2022-12-07 RX ADMIN — ACETAMINOPHEN 650 MG: 325 TABLET ORAL at 12:54

## 2022-12-07 RX ADMIN — METHOCARBAMOL 1000 MG: 500 TABLET ORAL at 07:43

## 2022-12-07 RX ADMIN — GABAPENTIN 200 MG: 100 CAPSULE ORAL at 12:54

## 2022-12-07 RX ADMIN — PANTOPRAZOLE SODIUM 40 MG: 40 TABLET, DELAYED RELEASE ORAL at 07:45

## 2022-12-07 RX ADMIN — OXYCODONE HYDROCHLORIDE 10 MG: 10 TABLET ORAL at 12:54

## 2022-12-07 RX ADMIN — AMOXICILLIN AND CLAVULANATE POTASSIUM 1 TABLET: 875; 125 TABLET, FILM COATED ORAL at 07:43

## 2022-12-07 ASSESSMENT — PAIN SCALES - GENERAL
PAINLEVEL_OUTOF10: 10
PAINLEVEL_OUTOF10: 8
PAINLEVEL_OUTOF10: 3

## 2022-12-07 ASSESSMENT — PAIN DESCRIPTION - LOCATION
LOCATION: LEG;HIP
LOCATION: LEG;HIP

## 2022-12-07 ASSESSMENT — PAIN DESCRIPTION - DESCRIPTORS
DESCRIPTORS: ACHING;THROBBING
DESCRIPTORS: ACHING;TINGLING

## 2022-12-07 ASSESSMENT — PAIN DESCRIPTION - ORIENTATION
ORIENTATION: RIGHT
ORIENTATION: RIGHT

## 2022-12-07 NOTE — PROGRESS NOTES
OCCUPATIONAL THERAPY TREATMENT NOTE    [facility] TREATMENT NOTE      Date:2022  Patient Name: Daryle Nick  MRN: [id number]  : 1955  Room: [address] [place] [address]       Evaluating OT:MICHEAL Ventura/L   License #  [id number]        Referring Provider: Chandrakant Hernandez DO    Specific Provider Orders/Date: OT evaluation & treatment        Diagnosis: Fall. Mary-prosthetic fracture around prosthetic hip, Closed fracture of proximal end of femur     Pertinent Medical History:  has no past medical history on file. Surgery: 22: ORIF RIGHT PERIPROSTHETIC HIP FRACTURE    Past Surgical History:  has a past surgical history that includes joint replacement; Cholecystectomy; and Femur fracture surgery (Right, 2022). Precautions:  Fall Risk, TTWB R LE      Assessment of current deficits    [x] Functional mobility            [x]ADLs           [x] Strength                  [x]Cognition    [x] Functional transfers          [x] IADLs         [x] Safety Awareness   [x]Endurance    [x] Fine Coordination                         [x] Balance      [] Vision/perception   [x]Sensation      []Gross Motor Coordination             [] ROM           [] Delirium                   [] Motor Control      OT PLAN OF CARE   OT POC based on physician orders, patient diagnosis and results of clinical assessment     Frequency/Duration: 2-4 days/wk for 2 weeks PRN   Specific OT Treatment Interventions to include:    Instruction/training on adapted ADL techniques and AE recommendations to increase functional independence within precautions  Training on energy conservation strategies, correct breathing pattern and techniques to improve independence/tolerance for self-care routine  Functional transfer/mobility training/DME recommendations for increased independence, safety, and fall prevention  Patient/Family education to increase follow through with safety techniques and functional independence  Recommendation of environmental modifications for increased safety with functional transfers/mobility and ADLs  Therapeutic exercise to improve motor endurance, ROM, and functional strength for ADLs/functional transfers  Therapeutic activities to facilitate/challenge dynamic balance, stand tolerance for increased safety and independence with ADLs  Therapeutic activities to facilitate gross/fine motor skills for increased independence with ADLs  Positioning to improve skin integrity, interaction with environment and functional independence     Recommended Adaptive Equipment:TBD     Home Living: Pt lives alone but states son & nice able to stay with her upon d/c, in a 2 story with 3 steps to enter with 2 HR. B&B on 1st level. Bathroom setup: walk in shower   Equipment owned: toilet riser, A.E.shower seat     Prior Level of Function: Ind. with ADLs , Ind. with IADLs; ambulated no A.D. Driving: active  Occupation: bank teller     Pain Level: 8/10; R hip at rest and 4/10 R hip after mobility  Cognition: A&O: 4/4; Follows multi- step directions              Memory:  G              Sequencing:  G              Problem solving:  G              Judgement/safety:  G                Functional Assessment:  AM-PAC Daily Activity Raw Score: 16/24    Initial Eval Status  Date: 12-6-22 Treatment Status  Date:12/7/22 STGs = LTGs  Time frame: 10-14 days   Feeding Ind. Independent   Up in chair Mod I/ Ind   Grooming Set up setup  Modified [place]    UB Dressing Set up to change gown  SBA Modified [place]    LB Dressing Max A to adalid brief, B socks Mod A able to don L sock and thread underwear, able to assist in pulling up supine  Modified [place]    Bathing Mod A with bathing cloths  simulated mod A Modified [place]    Toileting Max A, pure wick Max A  purr wick and urinary incontinence noted given underwear and pad.  Requested BSC Modified [place]    Bed Mobility  Supine to sit: Min A  Sit to supine: NT  min A supine to sit Supine to sit: Modified [place]   Sit to supine: Modified [place]    Functional Transfers Min A with sit <> stand, SPT using ww Min A sit to stand   Modified [place]    Functional Mobility Min A with ww short home distance, pt. Able to maintain TTWB R LE Stand pivot to chair SBA able to maintain TTWB with cues for safety and sequencing  Modified [place]    Balance Sitting:     Static:  Sup    Dynamic:SBA  Standing: Min A Static good  Dynamic SBA  Standing SBA with ww      Activity Tolerance F   G   Visual/  Perceptual Glasses: yes          Vitals spO2 & HR WFL  O2 RA 97%  WFL        Comments: Upon arrival pt supine and motivated to get up and move. Requested ww and BSC to be placed in room. Performed bed mobility , LE dressing , walker safety , bathroom safety, review of AE with practice of reacher for sock removal- patient has AE. Review of DME has shower seat and elevated commode. Encouraged smoking cessation for maximal bone healing. At end of session sitting up in chair with RLE supported on stool and  all lines and tubes intact, call light within reach. Pt has made good progress towards set goals. Continue with current plan of care      Treatment Time In:8:20            Treatment Time Out: 8:45                Treatment Charges: Mins Units   Ther Ex  [id number]     Manual Therapy [id number]     Thera Activities [id number] 15 1   ADL/Home Mgt [id number] 10 1   Neuro Re-ed [id number]     Group Therapy      Orthotic manage/training  [id number]     Non-Billable Time     Total Timed Treatment 25 2     Earlene Giordano.  Petra 72, Will 70

## 2022-12-07 NOTE — DISCHARGE SUMMARY
Hospital Medicine Discharge Summary    Patient ID: Victor Manuel Villeda      Patient's PCP: No primary care provider on file. Admit Date: 12/3/2022     Discharge Date:  12/7/2022    Admitting Physician: Joel Rust DO     Discharge Physician: Lalita Emerson MD    Discharge Diagnoses: Active Hospital Problems    Diagnosis Date Noted    Mary-prosthetic fracture around prosthetic hip, initial encounter [M97. Ernesto Padilla, [address] 12/03/2022     Priority: Medium       The patient was seen and examined on day of discharge and this discharge summary is in conjunction with any daily progress note from day of discharge. Hospital Course:   HPI from chart :   \"  Patient admitted on 12/3/2022 Ms. Victor Manuel Villeda, a 79y.o. year old female  who  has no past medical history on file. Victor Manuel Villeda is a 79 y.o. female presenting to the ED for history of fall down steps, beginning short time ago. The complaint has been persistent, moderate in severity, and worsened by movement of right hip. Presenting here because of fall. Reports that she tripped and fell. She was using her slippers and fell down 3 steps. patient did strike her head. Patient reporting no chest pain or difficulty breathing she does complain of hip pain. Patient reporting no abdominal pain or chest pain she reports no shortness of breath. Patient reporting no nausea or vomiting. Patient has not any blood thinners. Patient does complain of back pain. Patient reports that she is does have back pain normally. Imaging shows a periprosthetic fracture of the right femur. Fracture fragments are displaced approximately 11 mm distally. Fracture extends near to the cephalad extent of the residual bone. Orthopedic service was consulted. \"      Brief Hospital course:        --Closed, Right Periprosthetic hip Fracture status post fall at home.     S/P R periprosthetic femur ORIF - 12/4/22  Pain control, orthopedic surgery following   Patient declined rehab she would like to go home with outpatient therapy  Patient changed her mind 12/7 and she would like to go to nursing facility  Management arranging     -- Acute hypoxic respite failure likely secondary to atelectasis with possible pneumonia. Resolved now on room air, continue with incentive spirometer oral Augmentin and doxycycline     --Sinus tachycardia likely due to pain. Now resolved EKG essentially unremarkable, now resolved     --Hypertension likely worsened by pain due to above, close monitoring just as needed  At time of discharge patient was asymptomatic, physical exam was unremarkable, patient was very eager to be discharged. Condition at discharge improved and stable    Exam:     BP (!) 180/77   Pulse (!) 111   Temp 98.4 °F (36.9 °C) (Temporal)   Resp 16   Ht 5' 7\" (1.702 m)   Wt 191 lb (86.6 kg)   SpO2 94%   BMI 29.91 kg/m²     -General:  Awake, alert, oriented X 3. Well developed, well nourished, well groomed. No apparent distress. -HEENT:  Normocephalic, atraumatic. Pupils equal, round, reactive to light. No scleral icterus. No conjunctival injection. Normal lips, teeth, and gums. No nasal discharge. Neck:  Supple    -Heart:  RRR, no murmurs, gallops, rubs    -Lungs:  CTA bilaterally, bilat symmetrical expansion, no wheeze, rales, or rhonchi    -Abdomen: Bowel sounds present, soft, nontender, no masses, no organomegaly, no peritoneal signs    -Extremities: normal ROM. No Cyanosis clubbing or edema    -Skin: Warm and dry    -Neuro:  AAO x 3 . Cranial nerves 2-12 intact, no focal deficits     -Psych : normal .      Consults:     IP CONSULT TO PHYSICAL MEDICINE REHAB      Disposition:  SNF    Code Status:  Full Code     Activity: activity as tolerated    Diet: Regular /refer to dc instructions     Labs:  For convenience and continuity at follow-up the following most recent labs are provided:      CBC:    Lab Results   Component Value Date/Time    WBC 10.8 12/07/2022 05:24 AM HGB 8.7 12/07/2022 05:24 AM    HCT 26.8 12/07/2022 05:24 AM     12/07/2022 05:24 AM       Renal:    Lab Results   Component Value Date/Time     12/07/2022 05:24 AM    K 3.9 12/07/2022 05:24 AM    K 4.1 12/04/2022 05:10 AM     12/07/2022 05:24 AM    CO2 24 12/07/2022 05:24 AM    BUN 12 12/07/2022 05:24 AM    CREATININE 0.6 12/07/2022 05:24 AM    CALCIUM 8.9 12/07/2022 05:24 AM       Discharge Medications:     Current Discharge Medication List             Details   gabapentin (NEURONTIN) 100 MG capsule Take 2 capsules by mouth 3 times daily for 3 days. Qty: 18 capsule, Refills: 0      metoprolol tartrate (LOPRESSOR) 25 MG tablet Take 0.5 tablets by mouth 2 times daily  Qty: 60 tablet, Refills: 0      docusate sodium (COLACE, DULCOLAX) 100 MG CAPS Take 100 mg by mouth 2 times daily  Refills: 0      guaiFENesin-dextromethorphan (ROBITUSSIN DM) 100-10 MG/5ML syrup Take 5 mLs by mouth every 4 hours as needed for Cough  Qty: 120 mL, Refills: 0      polyethylene glycol (GLYCOLAX) 17 g packet Take 17 g by mouth 2 times daily as needed for Constipation  Qty: 527 g, Refills: 0      amoxicillin-clavulanate (AUGMENTIN) 875-125 MG per tablet Take 1 tablet by mouth every 12 hours for 5 days  Qty: 10 tablet, Refills: 0      methocarbamol 1000 MG TABS Take 1,000 mg by mouth 4 times daily for 10 days  Qty: 40 tablet, Refills: 0      doxycycline hyclate (VIBRAMYCIN) 100 MG capsule Take 1 capsule by mouth every 12 hours for 5 days  Qty: 10 capsule, Refills: 0      pantoprazole (PROTONIX) 40 MG tablet Take 1 tablet by mouth every morning (before breakfast)  Qty: 30 tablet, Refills: 0      aspirin 325 MG EC tablet Take 1 tablet by mouth 2 times daily for 28 days  Qty: 56 tablet, Refills: 0      oxyCODONE-acetaminophen (PERCOCET) 5-325 MG per tablet Take 1 tablet by mouth every 6 hours as needed for Pain for up to 7 days. Intended supply: 7 days.  Take lowest dose possible to manage pain  Qty: 28 tablet, Refills: 0 Comments: Reduce doses taken as pain becomes manageable  Associated Diagnoses: Mary-prosthetic fracture around prosthetic hip, initial encounter             Time Spent on discharge is more than 40 mins in the examination, evaluation, counseling and review of medications and discharge plan. Signed:     Ema Angela MD  12/7/2022

## 2022-12-07 NOTE — PLAN OF CARE
Problem: Discharge Planning  Goal: Discharge to home or other facility with appropriate resources  12/7/2022 1044 by Roberto Finney RN  Outcome: Progressing  12/7/2022 1038 by Roberto Finney RN  Outcome: Progressing     Problem: Pain  Goal: Verbalizes/displays adequate comfort level or baseline comfort level  12/7/2022 1044 by Roberto Finney RN  Outcome: Progressing  12/7/2022 1038 by Roberto Finney RN  Outcome: Progressing     Problem: Skin/Tissue Integrity  Goal: Absence of new skin breakdown  Description: 1. Monitor for areas of redness and/or skin breakdown  2. Assess vascular access sites hourly  3. Every 4-6 hours minimum:  Change oxygen saturation probe site  4. Every 4-6 hours:  If on nasal continuous positive airway pressure, respiratory therapy assess nares and determine need for appliance change or resting period.   12/7/2022 1044 by Roberto Finney RN  Outcome: Progressing  12/7/2022 1038 by Roberto Finney RN  Outcome: Progressing     Problem: Safety - Adult  Goal: Free from fall injury  12/7/2022 1044 by Roberto Finney RN  Outcome: Progressing  12/7/2022 1038 by Roberto Finney RN  Outcome: Progressing

## 2022-12-07 NOTE — CARE COORDINATION
12/7/2022 social work transition of care planning  Pt plan is to return home with [employer]. W/w to be delivered to room. Pt will call for a ride at discharge.   Electronically signed by MARY Billings on 12/7/2022 at 8:08 AM

## 2022-12-10 LAB
BLOOD CULTURE, ROUTINE: NORMAL
CULTURE, BLOOD 2: NORMAL

## 2022-12-16 DIAGNOSIS — Z96.649 PERI-PROSTHETIC FRACTURE AROUND PROSTHETIC HIP, INITIAL ENCOUNTER: Primary | ICD-10-CM

## 2022-12-16 DIAGNOSIS — M97.8XXA PERI-PROSTHETIC FRACTURE AROUND PROSTHETIC HIP, INITIAL ENCOUNTER: Primary | ICD-10-CM

## 2022-12-19 ENCOUNTER — HOSPITAL ENCOUNTER (OUTPATIENT)
Dept: GENERAL RADIOLOGY | Age: 67
Discharge: HOME | End: 2022-12-21
Payer: MEDICARE

## 2022-12-19 ENCOUNTER — OFFICE VISIT (OUTPATIENT)
Dept: ORTHOPEDIC SURGERY | Age: 67
End: 2022-12-19
Payer: MEDICARE

## 2022-12-19 VITALS — BODY MASS INDEX: 29.82 KG/M2 | HEIGHT: 67 IN | WEIGHT: 190 LBS

## 2022-12-19 DIAGNOSIS — M97.8XXA PERI-PROSTHETIC FRACTURE AROUND PROSTHETIC HIP, INITIAL ENCOUNTER: ICD-10-CM

## 2022-12-19 DIAGNOSIS — Z96.649 PERI-PROSTHETIC FRACTURE AROUND PROSTHETIC HIP, INITIAL ENCOUNTER: ICD-10-CM

## 2022-12-19 DIAGNOSIS — M97.8XXD PERI-PROSTHETIC FRACTURE AROUND PROSTHETIC HIP, SUBSEQUENT ENCOUNTER: Primary | ICD-10-CM

## 2022-12-19 DIAGNOSIS — Z96.649 PERI-PROSTHETIC FRACTURE AROUND PROSTHETIC HIP, SUBSEQUENT ENCOUNTER: Primary | ICD-10-CM

## 2022-12-19 PROCEDURE — 73552 X-RAY EXAM OF FEMUR 2/>: CPT

## 2022-12-19 PROCEDURE — 73502 X-RAY EXAM HIP UNI 2-3 VIEWS: CPT

## 2022-12-19 PROCEDURE — 99024 POSTOP FOLLOW-UP VISIT: CPT | Performed by: PHYSICIAN ASSISTANT

## 2022-12-19 PROCEDURE — 99213 OFFICE O/P EST LOW 20 MIN: CPT

## 2022-12-19 RX ORDER — TRAMADOL HYDROCHLORIDE 50 MG/1
50 TABLET ORAL EVERY 6 HOURS PRN
Qty: 28 TABLET | Refills: 0 | Status: SHIPPED | OUTPATIENT
Start: 2022-12-19 | End: 2022-12-26

## 2022-12-19 NOTE — PROGRESS NOTES
fracture s/p ORIF. R THERESA looks intact and stable. L THERESA from AP pelvis view also looks stable. Hardware remains intact without interval displacement, loosening, or failure. No significant change in alignment. No acute fractures or dislocations or any other osseus abnormality identified. Assessment:   Diagnosis Orders   1. Mary-prosthetic fracture around prosthetic hip, subsequent encounter  traMADol (ULTRAM) 50 MG tablet          Plan:  Reviewed x-rays with patient today in office   Removed suture from surgical incision line. Steri strips were placed. Patient tolerated procedure well with minimal pain. No Soaking or submerging incision in water until skin is fully healed. 1 staple from posterior scalp removed. Very small laceration appears healed. CT head taken at time of ED visit negative for any acute intra-cranial process. Trauma dept not consulted, no follow ups planned with trauma. WB:  Toe touch weight bearing left lower extremity- use assistive devices if needed  Therapy: continue HHC and HEP. Call for outpatient when done with Meek Melissa  Multimodal pain control. Otc NSAIDs and tylenol, RICE therapy. Tramadol 50mg q6hrs prn x7 days sent to pharmacy for breakthrough pain if needed  DVT Prophylaxis: Continue with  mg BID as ordered    Controlled Substance Monitoring:    Acute and Chronic Pain Monitoring:   RX Monitoring 12/19/2022   Periodic Controlled Substance Monitoring No signs of potential drug abuse or diversion identified. Follow up in 4 weeks with XR of the AP pelvis and R femur     Electronically signed by Benson Sierra PA-C on 12/19/2022 at 11:21 AM  Note: This report was completed using [employer] voiced recognition software. Every effort has been made to ensure accuracy; however, inadvertent computerized transcription errors may be present.

## 2022-12-22 NOTE — ANESTHESIA PRE PROCEDURE
Department of Anesthesiology  Preprocedure Note       Name:  Glenna Graves   Age:  79 y.o.  :  1955                                          MRN:  [id number]         Date:  2022      Surgeon: Goyo Angel):  Boom Reyes DO    Procedure: Procedure(s):  ORIF RIGHT PERIPROSTHETIC HIP FRACTURE WITH POSSIBLE REVISION - SYNTHES FOR ORIF    Medications prior to admission:   Prior to Admission medications    Not on File       Current medications:    Current Facility-Administered Medications   Medication Dose Route Frequency Provider Last Rate Last Admin    ceFAZolin (ANCEF) 2,000 mg in sterile water 20 mL IV syringe  2,000 mg IntraVENous On Call to [address] [place] [address],         morphine sulfate (PF) injection 4 mg  4 mg IntraVENous Q4H PRN [facility], DO   4 mg at 22 0409    sodium chloride flush 0.9 % injection 10 mL  10 mL IntraVENous 2 times per day [facility]        sodium chloride flush 0.9 % injection 10 mL  10 mL IntraVENous PRN [facility], DO   10 mL at 22 0410    0.9 % sodium chloride infusion   IntraVENous PRN [facility], DO        promethazine (PHENERGAN) tablet 12.5 mg  12.5 mg Oral Q6H PRN [facility], DO        Or    ondansetron [facility] injection 4 mg  4 mg IntraVENous Q6H PRN [facility], DO        polyethylene glycol (GLYCOLAX) packet 17 g  17 g Oral Daily PRN [facility], DO        acetaminophen (TYLENOL) tablet 650 mg  650 mg Oral Q6H PRN [facility], DO        Or    acetaminophen (TYLENOL) suppository 650 mg  650 mg Rectal Q6H PRN [facility], DO        0.9 % sodium chloride infusion   IntraVENous Continuous [facility], DO 75 mL/hr at 22 0655 New Bag at 22 06    methocarbamol (ROBAXIN) tablet 1,000 mg  1,000 mg Oral 4x Daily [facility], DO   1,000 mg at 22 003    gabapentin (NEURONTIN) capsule 200 mg  200 mg Oral TID [facility], DO   200 mg at 22 003    oxyCODONE (ROXICODONE) immediate release tablet 5 mg PATIENT WAS AT THE DENTIST AND BP /106 181/100 /106 . HE WAS TOLD TO CALL PCP.   5 mg Oral Q4H PRN Salma Sniff, DO        Or    oxyCODONE HCl (OXY-IR) immediate release tablet 10 mg  10 mg Oral Q4H PRN Salma Sniff, DO   10 mg at 12/04/22 0701       Allergies: Allergies   Allergen Reactions    Macrobid [Nitrofurantoin] Nausea And Vomiting       Problem List:    Patient Active Problem List   Diagnosis Code    Mary-prosthetic fracture around prosthetic hip, initial encounter M97Sonny Ryder, [id number]       Past Medical History:  History reviewed. No pertinent past medical history. Past Surgical History:        Procedure Laterality Date    CHOLECYSTECTOMY      JOINT REPLACEMENT         Social History:    Social History     Tobacco Use    Smoking status: Every Day     Packs/day: 1.00     Years: 40.00     Pack years: 40.00     Types: Cigarettes    Smokeless tobacco: Never   Substance Use Topics    Alcohol use: Yes     Comment: occasionally                                Ready to quit: Not Answered  Counseling given: Not Answered      Vital Signs (Current):   Vitals:    12/04/22 0400 12/04/22 0701 12/04/22 0731 12/04/22 0815   BP:    (!) 160/68   Pulse:    99   Resp:  16 16 17   Temp:    97.6 °F (36.4 °C)   TempSrc:    Temporal   SpO2:    93%   Weight: 191 lb (86.6 kg)      Height: 5' 7\" (1.702 m)                                                 BP Readings from Last 3 Encounters:   12/04/22 (!) 160/68       NPO Status: Time of last liquid consumption: 0701                        Time of last solid consumption: 2300                        Date of last liquid consumption: 12/04/22                        Date of last solid food consumption: 12/03/22    BMI:   Wt Readings from Last 3 Encounters:   12/04/22 191 lb (86.6 kg)     Body mass index is 29.91 kg/m².     CBC:   Lab Results   Component Value Date/Time    WBC 11.2 12/04/2022 05:10 AM    RBC 3.60 12/04/2022 05:10 AM    HGB 11.8 12/04/2022 05:10 AM    HCT 36.1 12/04/2022 05:10 AM    .3 12/04/2022 05:10 AM    RDW 15.1 12/04/2022 05:10 AM  12/04/2022 05:10 AM       CMP:   Lab Results   Component Value Date/Time     12/04/2022 05:10 AM    K 4.1 12/04/2022 05:10 AM     12/04/2022 05:10 AM    CO2 21 12/04/2022 05:10 AM    BUN 17 12/04/2022 05:10 AM    CREATININE 0.7 12/04/2022 05:10 AM    LABGLOM >60 12/04/2022 05:10 AM    GLUCOSE 120 12/04/2022 05:10 AM    PROT 7.1 12/03/2022 08:42 PM    CALCIUM 8.7 12/04/2022 05:10 AM    BILITOT 0.2 12/03/2022 08:42 PM    ALKPHOS 102 12/03/2022 08:42 PM    AST 21 12/03/2022 08:42 PM    ALT 14 12/03/2022 08:42 PM       POC Tests: No results for input(s): POCGLU, POCNA, POCK, POCCL, POCBUN, POCHEMO, POCHCT in the last 72 hours. Coags:   Lab Results   Component Value Date/Time    PROTIME 11.2 12/03/2022 10:35 PM    INR 1.0 12/03/2022 10:35 PM    APTT 27.0 12/03/2022 10:35 PM       HCG (If Applicable): No results found for: PREGTESTUR, PREGSERUM, HCG, HCGQUANT     ABGs: No results found for: PHART, PO2ART, JXB1LAU, ZXG2BDU, BEART, U2ZXQJOM     Type & Screen (If Applicable):  No results found for: LABABO, LABRH    Drug/Infectious Status (If Applicable):  No results found for: HIV, HEPCAB    COVID-19 Screening (If Applicable): No results found for: COVID19        Anesthesia Evaluation     history of anesthetic complications: PONV. Airway: Mallampati: III  TM distance: >3 FB   Neck ROM: full  Mouth opening: > = 3 FB   Dental: normal exam         Pulmonary:   (+) decreased breath sounds: right current smoker          Patient did not smoke on day of surgery. ROS comment: Active coughing, no wheezing. Cardiovascular:    (+) hypertension:,         Rhythm: regular                      Neuro/Psych:               GI/Hepatic/Renal:             Endo/Other:                     Abdominal:             Vascular: Other Findings:           Anesthesia Plan      general     ASA 3       Induction: intravenous.     MIPS: Postoperative opioids intended and Prophylactic antiemetics administered. Anesthetic plan and risks discussed with patient. Use of blood products discussed with patient whom consented to blood products. Plan discussed with CRNA.                     Pillo Moser MD   12/4/2022

## 2022-12-22 NOTE — CONSULTS
Chart reviewed. Case discussed with inpatient rehab admission coordinator. Please see her note for details.       Mayank Rey MD

## 2022-12-22 NOTE — CONSULTS
Chart reviewed. Case discussed with inpatient rehab admission coordinator. Please see her note for details.       Chon Thakkar MD

## 2022-12-26 NOTE — PROGRESS NOTES
Physician Progress Note      Vera Swan  CSN #:                  [id number]  :                       1955  ADMIT DATE:       12/3/2022 8:08 PM  [address] [place] DATE:        2022 1:33 PM  RESPONDING  PROVIDER #:        Arash Lorenzo MD          QUERY TEXT:    Patient admitted with R periprosthetic hip fracture. Noted documentation of   acute respiratory failure in discharge summary. In order to support the   diagnosis of acute respiratory failure, please include additional clinical   indicators in your documentation. Or please document if the diagnosis of   acute respiratory failure has been ruled out after further study. The medical record reflects the following:  Risk Factors: atelectasis and possible pneumonia  Clinical Indicators: Pt placed on 3L O2 via NC on  with SpO2 of 95-99%   then weaned to RA on , RR 16-18, Per internal med progress note on   - \". ..CTA bilaterally, bilat symmetrical expansion, no wheeze, rales,   or rhonchi. Valri Dykes Valri Dykes \"  Treatment: Oxygen therapy up to 3L O2 via NC, SpO2 monitoring, incentive   spirometer, CXR    Acute Respiratory Failure Clinical Indicators per  MS-DRG Training Guide and   Quick Reference Guide:  pO2 < 60 mmHg or SpO2 (pulse oximetry) < 91% breathing room air  pCO2 > 50 and pH < 7.35  P/F ratio (pO2 / FIO2) < 300  pO2 decrease or pCO2 increase by 10 mmHg from baseline (if known)  Supplemental oxygen of 40% or more  Presence of respiratory distress, tachypnea, dyspnea, shortness of breath,   wheezing  Unable to speak in complete sentences  Use of accessory muscles to breathe  Extreme anxiety and feeling of impending doom  Tripod position  Confusion/altered mental status/obtunded    Thank you,  Magaly Domingo RN, BSN, CDIS  Clinical Documentation Improvement  [email or web address]  Options provided:  -- Acute Respiratory Failure as evidenced by, Please document evidence.   -- Pt has respiratory insufficiency, Acute Respiratory Failure ruled out after   study  -- Respiratory insufficiency and Acute Respiratory Failure ruled out after   study  -- Other - I will add my own diagnosis  -- Disagree - Not applicable / Not valid  -- Disagree - Clinically unable to determine / Unknown  -- Refer to Clinical Documentation Reviewer    PROVIDER RESPONSE TEXT:    This patient is in acute respiratory failure as evidenced by atelectasis   /pneumonia. see dc summary . Query created by: Johnny Andrews on 12/14/2022 1:52 PM      Electronically signed by:   Neyda Capellan MD 12/26/2022 6:55 AM

## 2023-01-13 DIAGNOSIS — Z09 FOLLOW-UP EXAM: Primary | ICD-10-CM

## 2023-01-16 ENCOUNTER — HOSPITAL ENCOUNTER (OUTPATIENT)
Dept: GENERAL RADIOLOGY | Age: 68
Discharge: HOME | End: 2023-01-18
Payer: MEDICARE

## 2023-01-16 ENCOUNTER — OFFICE VISIT (OUTPATIENT)
Dept: ORTHOPEDIC SURGERY | Age: 68
End: 2023-01-16
Payer: MEDICARE

## 2023-01-16 DIAGNOSIS — M97.8XXD PERI-PROSTHETIC FRACTURE AROUND PROSTHETIC HIP, SUBSEQUENT ENCOUNTER: Primary | ICD-10-CM

## 2023-01-16 DIAGNOSIS — Z09 FOLLOW-UP EXAM: ICD-10-CM

## 2023-01-16 DIAGNOSIS — Z96.649 PERI-PROSTHETIC FRACTURE AROUND PROSTHETIC HIP, SUBSEQUENT ENCOUNTER: Primary | ICD-10-CM

## 2023-01-16 PROCEDURE — 99212 OFFICE O/P EST SF 10 MIN: CPT | Performed by: PHYSICIAN ASSISTANT

## 2023-01-16 PROCEDURE — 73552 X-RAY EXAM OF FEMUR 2/>: CPT

## 2023-01-16 PROCEDURE — 99024 POSTOP FOLLOW-UP VISIT: CPT | Performed by: PHYSICIAN ASSISTANT

## 2023-01-16 NOTE — PATIENT INSTRUCTIONS
Start progressive weightbearing on the on right lower extremity. A walker for support. Advance 25% total body weight to the right lower extremity per week only if tolerable. Once full weightbearing, can start to transition from a walker to a cane. Physical therapy will help you with this. Can consider a platform walker if continuing to have shoulder pain.

## 2023-01-16 NOTE — PROGRESS NOTES
Chief Complaint   Patient presents with    Post-Op Check     R periprosthetic femur ORIF 12/4/2022. Patient states that she is still having good days and bad days. Patient is still TTWB at this time and is in home PT. Patient states that home PT is on hold until WB status advances. OP:SURGEON: Dr. Dat Vargas DO  DATE OF PROCEDURE: 12/3/22  PROCEDURE:ORIF RIGHT PERIPROSTHETIC HIP FRACTURE    Subjective:  Brad Montero is approximately 6.5 weeks from the above surgery. She has been toe-touch weightbearing to the right lower extremity using a walker. She is receiving home health PT who is pleasant PT until weightbearing can be advanced. Denies any significant pain at the right hip or thigh. States she is having increased bilateral shoulder pain from using her walker. Denies calf pain, CP, SOB, fever, chills, malaise. Review of Systems -  all pertinent positives and negatives in HPI. Objective:    General: Alert and oriented X 3, normocephalic atraumatic, external ears and eye normal, sclera clear, no acute distress, respirations easy and unlabored with no audible wheezes, skin warm and dry, speech and dress appropriate for noted age, affect euthymic. Extremity:  Right Lower Extremity  Skin clean dry and intact, without signs of infection  Nontender throughout the hip and thigh  AROM of the hip without any discomfort   Compartments supple throughout thigh and leg  Calf supple and nontender  Demonstrates active knee flexion/extension, ankle plantar/dorsiflexion/great toe extension. States sensation intact to touch in sural/deep peroneal/superficial peroneal/saphenous/posterior tibial nerve distributions to foot/ankle. Palpable dorsalis pedis and posterior tibialis pulses, cap refill brisk in toes, foot warm/perfused. XR:   4 views of R femur demonstrating s/p ORIF for periprosthetic femur fracture. THERESA appears stable.  Difficult to evaluate extent of her fracture from overlying hardware and THERESA, but appears to be healing. Hardware remains intact without interval displacement, loosening, or failure. No significant change in alignment. No acute fractures or dislocations or any other osseus abnormality identified.      Assessment:   Diagnosis Orders   1. Mary-prosthetic fracture around prosthetic hip, subsequent encounter  XR FEMUR RIGHT (MIN 2 VIEWS)          Plan:  Reviewed x-rays with patient today in office   Start progressive weightbearing on the on right lower extremity.  A walker for support.  Advance 25% total body weight to the right lower extremity per week only if tolerable.  Once full weightbearing, can start to transition from a walker to a cane.  Physical therapy will help you with this.  Can consider a platform walker if continuing to have shoulder pain. Declining script for platform walker today  Therapy: continue [facility]. May need outpatient PT once [facility] has finished  Multimodal pain control, RICE therapy prn      Follow up in 5-6 weeks with XR of the R femur    Electronically signed by Greg Parekh PA-C on 1/16/2023 at 3:08 PM  Note: This report was completed using [employer] voiced recognition software.  Every effort has been made to ensure accuracy; however, inadvertent computerized transcription errors may be present.

## 2023-01-31 ENCOUNTER — TELEPHONE (OUTPATIENT)
Dept: ORTHOPEDIC SURGERY | Age: 68
End: 2023-01-31

## 2023-01-31 DIAGNOSIS — Z96.649 PERI-PROSTHETIC FRACTURE AROUND PROSTHETIC HIP, SUBSEQUENT ENCOUNTER: Primary | ICD-10-CM

## 2023-01-31 DIAGNOSIS — M97.8XXD PERI-PROSTHETIC FRACTURE AROUND PROSTHETIC HIP, SUBSEQUENT ENCOUNTER: Primary | ICD-10-CM

## 2023-01-31 NOTE — TELEPHONE ENCOUNTER
Patient message states that she is ready to start outpatient PT and requests a call back to provide information.      Future Appointments   Date Time Provider Farhan Samano   3/3/2023  9:00 AM SCHEDULE, SE [facility]  [facility]

## 2023-01-31 NOTE — TELEPHONE ENCOUNTER
Called and spoke with patient for pt information.     Future Appointments   Date Time Provider Department Center   3/3/2023  9:00 AM SCHEDULE, [facility]     Pend and routed

## 2023-03-03 ENCOUNTER — HOSPITAL ENCOUNTER (OUTPATIENT)
Dept: GENERAL RADIOLOGY | Age: 68
End: 2023-03-03
Payer: MEDICARE

## 2023-03-03 ENCOUNTER — OFFICE VISIT (OUTPATIENT)
Dept: ORTHOPEDIC SURGERY | Age: 68
End: 2023-03-03
Payer: MEDICARE

## 2023-03-03 DIAGNOSIS — Z96.649 PERI-PROSTHETIC FRACTURE AROUND PROSTHETIC HIP, SUBSEQUENT ENCOUNTER: ICD-10-CM

## 2023-03-03 DIAGNOSIS — M97.8XXD PERI-PROSTHETIC FRACTURE AROUND PROSTHETIC HIP, SUBSEQUENT ENCOUNTER: Primary | ICD-10-CM

## 2023-03-03 DIAGNOSIS — Z96.649 PERI-PROSTHETIC FRACTURE AROUND PROSTHETIC HIP, SUBSEQUENT ENCOUNTER: Primary | ICD-10-CM

## 2023-03-03 DIAGNOSIS — M97.8XXD PERI-PROSTHETIC FRACTURE AROUND PROSTHETIC HIP, SUBSEQUENT ENCOUNTER: ICD-10-CM

## 2023-03-03 PROCEDURE — 73552 X-RAY EXAM OF FEMUR 2/>: CPT

## 2023-03-03 PROCEDURE — 99212 OFFICE O/P EST SF 10 MIN: CPT | Performed by: ORTHOPAEDIC SURGERY

## 2023-03-03 NOTE — PROGRESS NOTES
Chief Complaint   Patient presents with    Post-Op Check     Right periprosthetic femur ORIF 12/4/2022. Patient having mild pain. States in PT 3x per week. OP:SURGEON: Dr. Maru Harrington DO  DATE OF PROCEDURE: 12/4/22  PROCEDURE:ORIF RIGHT PERIPROSTHETIC HIP FRACTURE    POD: 12 weeks    Subjective:  Larisa Hurt is following up from the above surgery. Patient is doing well. She reports that she only has pain on rare occasions to her right hip. She is working with physical therapy and doing well. She is no longer on blood thinners or pain medications. Patient is still using a cane to ambulate. Patient is interested in returning to work in April and resuming driving. She has no other orthopedic complaints, denies any numbness or tingling. Review of Systems -  All pertinent positives/negative in HPI     Objective:    General: Alert and oriented X 3, normocephalic atraumatic, external ears and eye normal, sclera clear, no acute distress, respirations easy and unlabored with no audible wheezes, skin warm and dry, speech and dress appropriate for noted age, affect euthymic. Extremity:  right Lower Extremity  Incision over the lateral hip well-healed  No tenderness to palpation   compartments soft and compressible  Palpable dorsalis pedis and posterior tibialis pulse, brisk cap refill to toes, foot warm and perfused  Sensation intact to light touch in sural/deep peroneal/superficial peroneal/saphenous/posterior tibial nerve distributions to foot/ankle  Demonstrates active ankle plantar/dorsiflexion/great toe extension     There were no vitals taken for this visit. XR:   X-ray right femur on 3-3 demonstrate right total hip arthroplasty status post ORIF of periprosthetic hip fracture. No acute fracture or dislocation. No migration or screw pullout noted about the plate.     Assessment:  Postop ORIF Rt periprosthetic fx    Plan:  WBAT  Xrays reviewed with patient  Plan of care discussed in detail, all questions sought and answered to patients satisfaction at this time. Patient will continue physical therapy for building strength  Patient instructed she may return to driving when ever she feels she is ready  Patient also instructed she may return to work whenever she feels she is ready  We will see patient again in 4 weeks with repeat imaging prior to her expected return to work date of April 3    Electronically signed by Bernadette Espitia DO on 3/3/2023 at 10:37 AM  Note: This report was completed using [employer] voiced recognition software. Every effort has been made to ensure accuracy; however, inadvertent computerized transcription errors may be present. I performed a history and physical exam of the patient, reviewed pertinent imaging, and discussed the patient's management with the resident. I reviewed the resident's note and agree with the documented findings and plan of care.

## 2023-03-30 ENCOUNTER — OFFICE VISIT (OUTPATIENT)
Dept: ORTHOPEDIC SURGERY | Age: 68
End: 2023-03-30
Payer: MEDICARE

## 2023-03-30 ENCOUNTER — HOSPITAL ENCOUNTER (OUTPATIENT)
Dept: GENERAL RADIOLOGY | Age: 68
Discharge: HOME | End: 2023-04-01
Payer: MEDICARE

## 2023-03-30 VITALS — WEIGHT: 185 LBS | HEIGHT: 67 IN | BODY MASS INDEX: 29.03 KG/M2

## 2023-03-30 DIAGNOSIS — Z96.649 PERI-PROSTHETIC FRACTURE AROUND PROSTHETIC HIP, INITIAL ENCOUNTER: Primary | ICD-10-CM

## 2023-03-30 DIAGNOSIS — M97.8XXA PERI-PROSTHETIC FRACTURE AROUND PROSTHETIC HIP, INITIAL ENCOUNTER: Primary | ICD-10-CM

## 2023-03-30 DIAGNOSIS — M97.8XXD PERI-PROSTHETIC FRACTURE AROUND PROSTHETIC HIP, SUBSEQUENT ENCOUNTER: ICD-10-CM

## 2023-03-30 DIAGNOSIS — Z96.649 PERI-PROSTHETIC FRACTURE AROUND PROSTHETIC HIP, SUBSEQUENT ENCOUNTER: ICD-10-CM

## 2023-03-30 PROCEDURE — 73552 X-RAY EXAM OF FEMUR 2/>: CPT

## 2023-03-30 PROCEDURE — 4004F PT TOBACCO SCREEN RCVD TLK: CPT | Performed by: ORTHOPAEDIC SURGERY

## 2023-03-30 PROCEDURE — 99212 OFFICE O/P EST SF 10 MIN: CPT | Performed by: ORTHOPAEDIC SURGERY

## 2023-03-30 PROCEDURE — G8400 PT W/DXA NO RESULTS DOC: HCPCS | Performed by: ORTHOPAEDIC SURGERY

## 2023-03-30 PROCEDURE — G8427 DOCREV CUR MEDS BY ELIG CLIN: HCPCS | Performed by: ORTHOPAEDIC SURGERY

## 2023-03-30 PROCEDURE — 1123F ACP DISCUSS/DSCN MKR DOCD: CPT | Performed by: ORTHOPAEDIC SURGERY

## 2023-03-30 PROCEDURE — G8484 FLU IMMUNIZE NO ADMIN: HCPCS | Performed by: ORTHOPAEDIC SURGERY

## 2023-03-30 PROCEDURE — 1090F PRES/ABSN URINE INCON ASSESS: CPT | Performed by: ORTHOPAEDIC SURGERY

## 2023-03-30 PROCEDURE — G8417 CALC BMI ABV UP PARAM F/U: HCPCS | Performed by: ORTHOPAEDIC SURGERY

## 2023-03-30 PROCEDURE — 99213 OFFICE O/P EST LOW 20 MIN: CPT | Performed by: ORTHOPAEDIC SURGERY

## 2023-03-30 PROCEDURE — 3017F COLORECTAL CA SCREEN DOC REV: CPT | Performed by: ORTHOPAEDIC SURGERY

## 2023-03-30 RX ORDER — METHYLPREDNISOLONE 4 MG/1
TABLET ORAL
COMMUNITY
Start: 2023-03-29

## 2023-04-04 ENCOUNTER — TELEPHONE (OUTPATIENT)
Dept: ORTHOPEDIC SURGERY | Age: 68
End: 2023-04-04

## 2023-04-04 NOTE — PROGRESS NOTES
with slight bending questionable breakage. There is callus formation about the fracture site. Assessment:  Postop ORIF Rt periprosthetic fx    Plan:  WBAT  Xrays reviewed with patient, discussed the 1 screw which is changed since last visit, clinically patient doing very well without any pain at this time discussed we could certainly continue to observe. Recommended patient follow-up in a couple months for repeat evaluation with repeat x-rays to ensure stable stem and stable construct without any further failure which she understands or she can contact the office for more immediate follow-up if any issues or concerns  Recommend she continues with PT, continue with activities as tolerated, strengthening, gait training  Discussed with patient she can return to work as expected    Electronically signed by Minh Spivey DO on 3/30/23    Note: This report was completed using [employer] voiced recognition software. Every effort has been made to ensure accuracy; however, inadvertent computerized transcription errors may be present.

## 2023-04-04 NOTE — TELEPHONE ENCOUNTER
Called and spoke with patient, advised that we will send release letter out for her and confirmed fax number. No further questions at this time. No future appointments.

## 2023-04-04 NOTE — TELEPHONE ENCOUNTER
Patient message states that she has returned to work but was told that she needs a letter to be officially released. Requests letter faxed to  [phone]. No future appointments.     Routed

## (undated) DEVICE — PADDING CAST W6INXL4YD COT LO LINTING WYTEX

## (undated) DEVICE — SOLUTION IRRIG 3000ML 0.9% SOD CHL USP UROMATIC PLAS CONT

## (undated) DEVICE — GOWN,BREATHABLE SLV,AURORA,XLG,STRL: Brand: MEDLINE

## (undated) DEVICE — APPLICATOR MEDICATED 26 CC SOLUTION HI LT ORNG CHLORAPREP

## (undated) DEVICE — BIT DRL L180MM DIA4.3MM QUIK CPL W/O STP REUSE

## (undated) DEVICE — BIT DRL L145MM DIA3.2MM ST QUIK CPL W/O STP REUSE

## (undated) DEVICE — BNDG,ELSTC,MATRIX,STRL,6"X5YD,LF,HOOK&LP: Brand: MEDLINE

## (undated) DEVICE — GLOVE ORTHO 8   MSG9480

## (undated) DEVICE — HANDPIECE SET WITH BONE CLEANING TIP AND SUCTION TUBE: Brand: INTERPULSE

## (undated) DEVICE — Device

## (undated) DEVICE — GUIDEWIRE ORTH DIA2.5MM LOK SMOOTH DRL TIP FLX THRD FOR

## (undated) DEVICE — GAUZE,SPONGE,4"X4",8PLY,STRL,LF,10/TRAY: Brand: MEDLINE

## (undated) DEVICE — 2.8MM DRILL BIT

## (undated) DEVICE — BIT DRL L145MM DIA3.2MM QUIK CPL W/O STP REUSE

## (undated) DEVICE — SOLUTION IV IRRIG WATER 1000ML POUR BRL 2F7114

## (undated) DEVICE — 2.5MM DRILL BIT

## (undated) DEVICE — DRAPE C ARM W41XL74IN UNIV MOB W RUBBERBAND CLP

## (undated) DEVICE — BIT DRL 230/200MM CALIB DIA3.2MM 3 FLUT QUIK CPL

## (undated) DEVICE — BANDAGE COMPR W4INXL10YD WHITE/BEIGE E MTRX HK LOOP CLSR

## (undated) DEVICE — ELECTRODE PT RET AD L9FT HI MOIST COND ADH HYDRGEL CORDED

## (undated) DEVICE — GLOVE ORANGE PI 8   MSG9080

## (undated) DEVICE — SOLUTION IV IRRIG POUR BRL 0.9% SODIUM CHL 2F7124

## (undated) DEVICE — AGENT HEMSTAT 5GM ARISTA AH

## (undated) DEVICE — LOWER EXT HIP DRAPE: Brand: MEDLINE INDUSTRIES, INC.

## (undated) DEVICE — TUBING SUCT 12FR MAL ALUM SHFT FN CAP VENT UNIV CONN W/ OBT

## (undated) DEVICE — PENCIL ES L3M BTTN SWCH HOLSTER W/ BLDE ELECTRD EDGE

## (undated) DEVICE — DRESSING HYDROFIBER AQUACEL AG ADVANTAGE 3.5X14 IN